# Patient Record
Sex: FEMALE | Race: WHITE | Employment: UNEMPLOYED
[De-identification: names, ages, dates, MRNs, and addresses within clinical notes are randomized per-mention and may not be internally consistent; named-entity substitution may affect disease eponyms.]

---

## 2019-06-28 ENCOUNTER — NURSE TRIAGE (OUTPATIENT)
Dept: OTHER | Facility: CLINIC | Age: 34
End: 2019-06-28

## 2019-06-28 ENCOUNTER — HOSPITAL ENCOUNTER (EMERGENCY)
Age: 34
Discharge: HOME | End: 2019-06-28
Payer: COMMERCIAL

## 2019-06-28 ENCOUNTER — APPOINTMENT (OUTPATIENT)
Dept: GENERAL RADIOLOGY | Age: 34
End: 2019-06-28
Payer: COMMERCIAL

## 2019-06-28 VITALS
TEMPERATURE: 97 F | BODY MASS INDEX: 23.92 KG/M2 | SYSTOLIC BLOOD PRESSURE: 118 MMHG | RESPIRATION RATE: 16 BRPM | DIASTOLIC BLOOD PRESSURE: 82 MMHG | OXYGEN SATURATION: 100 % | HEART RATE: 67 BPM | WEIGHT: 130 LBS | HEIGHT: 62 IN

## 2019-06-28 DIAGNOSIS — M25.511 ACUTE PAIN OF RIGHT SHOULDER: Primary | ICD-10-CM

## 2019-06-28 PROCEDURE — 99283 EMERGENCY DEPT VISIT LOW MDM: CPT

## 2019-06-28 PROCEDURE — 73030 X-RAY EXAM OF SHOULDER: CPT

## 2019-06-28 RX ORDER — NAPROXEN 500 MG/1
500 TABLET ORAL 2 TIMES DAILY WITH MEALS
Qty: 20 TABLET | Refills: 0 | Status: SHIPPED | OUTPATIENT
Start: 2019-06-28 | End: 2019-07-02

## 2019-06-28 ASSESSMENT — PAIN DESCRIPTION - FREQUENCY: FREQUENCY: INTERMITTENT

## 2019-06-28 ASSESSMENT — PAIN - FUNCTIONAL ASSESSMENT: PAIN_FUNCTIONAL_ASSESSMENT: PREVENTS OR INTERFERES SOME ACTIVE ACTIVITIES AND ADLS

## 2019-06-28 ASSESSMENT — PAIN DESCRIPTION - ONSET: ONSET: ON-GOING

## 2019-06-28 ASSESSMENT — PAIN DESCRIPTION - PAIN TYPE: TYPE: ACUTE PAIN

## 2019-06-28 ASSESSMENT — PAIN DESCRIPTION - LOCATION: LOCATION: SHOULDER

## 2019-06-28 ASSESSMENT — PAIN DESCRIPTION - DESCRIPTORS: DESCRIPTORS: BURNING;SHARP

## 2019-06-28 ASSESSMENT — PAIN DESCRIPTION - ORIENTATION: ORIENTATION: RIGHT

## 2019-06-28 ASSESSMENT — PAIN SCALES - GENERAL: PAINLEVEL_OUTOF10: 7

## 2019-06-28 NOTE — TELEPHONE ENCOUNTER
Reason for Disposition   Injury and pain has not improved after 3 days    Protocols used: SHOULDER INJURY-ADULT-[place]    Patient's location of employment: [address] [place] [address] Usman Pena  Location of injury: right shoulder  Time of injury: 0900 on 6/24/2019  Last 4 of patient's SSN: [id number]  Location recommended for treatment: [facility] ED and then [facility]    Patient reports she was at work on 6/24/2019 and moving a patient and felt a burning sensation in right shoulder with resulting pain. Patient reports she has been off of work since initial injury, with rest to right shoulder and her first day back was today. Patient reports she is still with right shoulder pain that has not improved since initial injury and is worse with movement and lifting. Patient denies loss of function of right shoulder, denies numbness or tingling of right arm, denies any visible changes to right arm. Writer recommended patient be evaluated within the next 3 days.
no

## 2019-06-28 NOTE — ED PROVIDER NOTES
[facility]    HPI:  6/28/19,   Time: 11:23 AM         Elida Buchanan is a 28 y.o. 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 The complaint has been persistent, mild in severity, and worsened by movement of right shoulder    ROS:   Pertinent positives and negatives are stated within HPI, all other systems reviewed and are negative.  --------------------------------------------- PAST HISTORY ---------------------------------------------  Past Medical History:  has a past medical history of GERD (gastroesophageal reflux disease), Hyperlipidemia, Hypothyroidism, and Thyroid disease. Past Surgical History:  has no past surgical history on file. Social History:  reports that she has never smoked. She has never used smokeless tobacco. She reports that she does not drink alcohol or use drugs. Family History: family history is not on file. The patients home medications have been reviewed. Allergies: Bee venom    -------------------------------------------------- RESULTS -------------------------------------------------  All laboratory and radiology results have been personally reviewed by myself   LABS:  No results found for this visit on 06/28/19. RADIOLOGY:  Interpreted by Radiologist.  XR SHOULDER RIGHT (MIN 2 VIEWS)   Final Result   No radiographic evidence of acute osseous abnormality or   fracture. . If there is persistent clinical pain or symptomatology, a   return to medical attention within 2-7 days and further imaging is   recommended. ------------------------- NURSING NOTES AND VITALS REVIEWED ---------------------------   The nursing notes within the ED encounter and vital signs as below have been reviewed.    /82   Pulse 67   Temp 97 °F (36.1 °C) (Oral)   Resp 16   Ht 5' 2\" (1.575 m)   Wt 130 lb (59 kg)   LMP 06/07/2019   SpO2 100%   BMI

## 2019-07-23 ENCOUNTER — NURSE TRIAGE (OUTPATIENT)
Dept: OTHER | Facility: CLINIC | Age: 34
End: 2019-07-23

## 2020-06-25 ENCOUNTER — OFFICE VISIT (OUTPATIENT)
Dept: PAIN MANAGEMENT | Age: 35
End: 2020-06-25
Payer: COMMERCIAL

## 2020-06-25 VITALS
WEIGHT: 128 LBS | HEIGHT: 62 IN | DIASTOLIC BLOOD PRESSURE: 68 MMHG | HEART RATE: 63 BPM | OXYGEN SATURATION: 99 % | BODY MASS INDEX: 23.55 KG/M2 | RESPIRATION RATE: 18 BRPM | TEMPERATURE: 98.5 F | SYSTOLIC BLOOD PRESSURE: 98 MMHG

## 2020-06-25 PROBLEM — M53.3 DISORDER OF SACRUM: Status: ACTIVE | Noted: 2020-06-25

## 2020-06-25 PROBLEM — M47.816 LUMBAR SPONDYLOSIS: Status: ACTIVE | Noted: 2020-06-25

## 2020-06-25 PROBLEM — M47.816 LUMBAR FACET ARTHROPATHY: Status: ACTIVE | Noted: 2020-06-25

## 2020-06-25 PROBLEM — M79.18 MYOFASCIAL PAIN SYNDROME: Status: ACTIVE | Noted: 2020-06-25

## 2020-06-25 PROBLEM — G89.4 CHRONIC PAIN SYNDROME: Status: ACTIVE | Noted: 2020-06-25

## 2020-06-25 PROCEDURE — 1036F TOBACCO NON-USER: CPT | Performed by: PAIN MEDICINE

## 2020-06-25 PROCEDURE — 99204 OFFICE O/P NEW MOD 45 MIN: CPT | Performed by: PAIN MEDICINE

## 2020-06-25 PROCEDURE — G8420 CALC BMI NORM PARAMETERS: HCPCS | Performed by: PAIN MEDICINE

## 2020-06-25 PROCEDURE — G8427 DOCREV CUR MEDS BY ELIG CLIN: HCPCS | Performed by: PAIN MEDICINE

## 2020-06-25 RX ORDER — TIZANIDINE 2 MG/1
2 TABLET ORAL DAILY PRN
Qty: 30 TABLET | Refills: 0 | Status: SHIPPED | OUTPATIENT
Start: 2020-06-25 | End: 2020-07-25

## 2020-06-25 NOTE — PROGRESS NOTES
Marital status:      Spouse name: Not on file    Number of children: Not on file    Years of education: Not on file    Highest education level: Not on file   Occupational History    Not on file   Social Needs    Financial resource strain: Not on file    Food insecurity     Worry: Not on file     Inability: Not on file    Transportation needs     Medical: Not on file     Non-medical: Not on file   Tobacco Use    Smoking status: Never Smoker    Smokeless tobacco: Never Used   Substance and Sexual Activity    Alcohol use: Yes    Drug use: Yes     Types: Marijuana     Comment: tried for pain    Sexual activity: Yes     Partners: Male   Lifestyle    Physical activity     Days per week: Not on file     Minutes per session: Not on file    Stress: Not on file   Relationships    Social connections     Talks on phone: Not on file     Gets together: Not on file     Attends Denominational service: Not on file     Active member of club or organization: Not on file     Attends meetings of clubs or organizations: Not on file     Relationship status: Not on file    Intimate partner violence     Fear of current or ex partner: Not on file     Emotionally abused: Not on file     Physically abused: Not on file     Forced sexual activity: Not on file   Other Topics Concern    Not on file   Social History Narrative    Not on file     History reviewed. No pertinent family history. REVIEW OF SYSTEMS:     Patient specifically denies fever/chills, chest pain, shortness of breath, new bowel or bladder complaints. All other review of systems was negative. PHYSICAL EXAMINATION:      BP 98/68   Pulse 63   Temp 98.5 °F (36.9 °C) (Oral)   Resp 18   Ht 5' 2\" (1.575 m)   Wt 128 lb (58.1 kg)   LMP 06/02/2020 (Approximate)   SpO2 99%   BMI 23.41 kg/m²     General:      General appearance:   pleasant and well-hydrated.    , in moderate discomfort and A & O x3  Build:Normal Weight    HEENT:    Head:normocephalic and

## 2020-07-06 ENCOUNTER — HOSPITAL ENCOUNTER (OUTPATIENT)
Age: 35
Discharge: HOME | End: 2020-07-06
Payer: COMMERCIAL

## 2020-07-06 LAB
T4 FREE: 0.74 NG/DL (ref 0.93–1.7)
TSH SERPL DL<=0.05 MIU/L-ACNC: 39.48 UIU/ML (ref 0.27–4.2)

## 2020-07-06 PROCEDURE — 84443 ASSAY THYROID STIM HORMONE: CPT

## 2020-07-06 PROCEDURE — 36415 COLL VENOUS BLD VENIPUNCTURE: CPT

## 2020-07-06 PROCEDURE — 84439 ASSAY OF FREE THYROXINE: CPT

## 2020-07-10 ENCOUNTER — TELEPHONE (OUTPATIENT)
Dept: PAIN MANAGEMENT | Age: 35
End: 2020-07-10

## 2020-07-13 ENCOUNTER — HOSPITAL ENCOUNTER (OUTPATIENT)
Dept: MRI IMAGING | Age: 35
Discharge: HOME | End: 2020-07-15
Payer: COMMERCIAL

## 2020-07-13 PROCEDURE — 72148 MRI LUMBAR SPINE W/O DYE: CPT

## 2020-07-29 ENCOUNTER — OFFICE VISIT (OUTPATIENT)
Dept: PAIN MANAGEMENT | Age: 35
End: 2020-07-29
Payer: COMMERCIAL

## 2020-07-29 VITALS
DIASTOLIC BLOOD PRESSURE: 62 MMHG | OXYGEN SATURATION: 98 % | WEIGHT: 130 LBS | BODY MASS INDEX: 23.92 KG/M2 | TEMPERATURE: 98 F | HEART RATE: 64 BPM | RESPIRATION RATE: 16 BRPM | SYSTOLIC BLOOD PRESSURE: 98 MMHG | HEIGHT: 62 IN

## 2020-07-29 PROCEDURE — G8420 CALC BMI NORM PARAMETERS: HCPCS | Performed by: PAIN MEDICINE

## 2020-07-29 PROCEDURE — 99213 OFFICE O/P EST LOW 20 MIN: CPT | Performed by: PAIN MEDICINE

## 2020-07-29 PROCEDURE — G8427 DOCREV CUR MEDS BY ELIG CLIN: HCPCS | Performed by: PAIN MEDICINE

## 2020-07-29 PROCEDURE — 1036F TOBACCO NON-USER: CPT | Performed by: PAIN MEDICINE

## 2020-07-29 PROCEDURE — 99214 OFFICE O/P EST MOD 30 MIN: CPT | Performed by: PAIN MEDICINE

## 2020-07-29 NOTE — PROGRESS NOTES
Do you currently have any of the following:    Fever: No  Headache:  No  Cough: No  Shortness of breath: No  Exposed to anyone with these symptoms: No                                                                                                                Magdi Cifuentes presents to the [address] [address] on 7/29/2020. Tony Maldonado is complaining of pain lower back. The pain is intermittent. The pain is described as throbbing, shooting, stabbing, dull and sharp. Pain is rated on her best day at a 2, on her worst day at a 10, and on average at a 4 on the VAS scale. She took her last dose of Motrin 600mg. Tony Maldonado does have issues with constipation. Any procedures since your last visit: No, with  % relief. She is  on NSAIDS and  is not on anticoagulation medications to include none and is managed by . Pacemaker or defibrilator: No Physician managing device is . BP 98/62   Pulse 64   Temp 98 °F (36.7 °C) (Oral)   Resp 16   Ht 5' 2\" (1.575 m)   Wt 130 lb (59 kg)   SpO2 98%   BMI 23.78 kg/m²      No LMP recorded.

## 2020-07-29 NOTE — PROGRESS NOTES
[facility], [address] [place] [address] Pankaj  [phone]    Follow up Note      Sam Barreto     Date of Visit:  7/29/2020    CC:  Patient presents for follow up   Chief Complaint   Patient presents with    Follow-up     lower back     HPI:    Pain is unchanged. Appropriate analgesia with current medications regimen:N/A. Change in quality of symptoms:no. Medication side effects:not applicable . Recent diagnostic testing:Lumbar spine MRI. Recent interventional procedures:none. .    She has not been on anticoagulation medications to include none. The patient  has not been on herbal supplements. The patient is not diabetic.     Imaging:   Lumbar spine MRI 2020:  Degenerative disc disease as described above, without spinal canal narrowing.         Foraminal narrowing, mild at right L4-5, minimal at right L5-S1. Lumbar spine Xray 2020  3 views of the lumbar spine were obtained. There are 5 lumbar   vertebral bodies which are of normal alignment. I see no fracture or   destructive process. There is mild degenerative changes involving L4-S1   level with marked disc space narrowing.     Previous treatments: Physical Therapy and medications. .         Potential Aberrant Drug-Related Behavior:    None    Urine Drug Screening:  None    OARRS report:  07/2020 consistent. Past Medical History:   Diagnosis Date    GERD (gastroesophageal reflux disease)     Hyperlipidemia     Hypothyroidism     Thyroid disease      History reviewed. No pertinent surgical history. Prior to Admission medications    Medication Sig Start Date End Date Taking?  Authorizing Provider   levothyroxine (SYNTHROID) 50 MCG tablet Take 1 tablet by mouth daily 7/10/20  Yes Melanie Ramirez, DO   levothyroxine (SYNTHROID) 50 MCG tablet Take 1 tablet by mouth daily 4/15/20  Yes Melanie Ramirez, DO   sertraline (ZOLOFT) 50 MG tablet Take 1 tablet by mouth daily 4/15/20  Yes Karli Lozano negative. PHYSICAL EXAMINATION:      BP 98/62   Pulse 64   Temp 98 °F (36.7 °C) (Oral)   Resp 16   Ht 5' 2\" (1.575 m)   Wt 130 lb (59 kg)   SpO2 98%   BMI 23.78 kg/m²     General:       General appearance:   pleasant and well-hydrated. , in mild to moderate discomfort and A & O x3  Build:Normal Weight     HEENT:     Head:normocephalic and atraumatic  Sclera: icterus absent,      Lungs:     Breathing:Breathing Pattern: regular, no distress     Abdomen:     Shape:non-distended and normal  Tenderness:none and suprapubic     Lumbar spine:     Spine inspection:normal   CVA tenderness:No   Palpation:tenderness paravertebral muscles, facet loading, left, right, positive and tenderness. Range of motion:abnormal moderately Lateral bending, flexion, extension rotation bilateral and is  painful.     Musculoskeletal:     Trigger points in Paraveteral:absent bilaterally  SI joint tenderness:positive right, negative left              LEATHA test:positive right, negative left  Piriformis tenderness:negative right, negative left  Trochanteric bursa tenderness:negative right, negative left  SLR:negative right, negative left, sitting      Extremities:     Tremors:None bilaterally upper and lower  Range of motion:pain with internal rotation of hips negative. Intact:Yes  Edema:Normal     Neurological:     Sensory:normal to light touch bilateral lower extremities  Motor:                          Right Quadriceps5/5          Left Quadriceps5/5           Right Gastrocnemius5/5    Left Gastrocnemius5/5  Right Ant Tibialis5/5  Left Ant Tibialis5/5  Reflexes:    Right Quadriceps reflex2+  Left Quadriceps reflex2+  Right Achilles reflex2+  Left Achilles reflex2+  Gait:normal     Dermatology:     Skin:no unusual rashes and no skin lesions    Kaur Glen Spey was present for the entire physical exam.     Impression:  Chronic low back pain with no radiculopathy. Lumbar spine Xray: mild degenerative changes L4-S1.   Plan:  Follow up on her low back and right buttocks pain. Results of lumbar spine MRI were discussed with the patient including actual images. L4-5 and L5-S1 degenerative disc disease. Patient didn't start physical therapy yet. Will consider LESI if her response is less than expected with physical therapy. Continue with OTC pain medications. Continue with Zanaflex 2 mg QD PRN. OARRS report reviewed 07/2020. Patient encouraged to stay active. Treatment plan discussed with the patient including medications side effects. We discussed with the patient that combining opioids, benzodiazepines, alcohol, illicit drugs or sleep aids increases the risk of respiratory depression including death. We discussed that these medications may cause drowsiness, sedation or dizziness and have counseled the patient not to drive or operate machinery. We have discussed that these medications will be prescribed only by one provider. We have discussed with the patient about age related risk factors and have thoroughly discussed the importance of taking these medications as prescribed. The patient verbalizes understanding. ccrefvalentina Ibrahim M.D.

## 2020-08-10 ENCOUNTER — EVALUATION (OUTPATIENT)
Dept: PHYSICAL THERAPY | Age: 35
End: 2020-08-10
Payer: COMMERCIAL

## 2020-08-10 PROCEDURE — 97162 PT EVAL MOD COMPLEX 30 MIN: CPT | Performed by: PHYSICAL THERAPIST

## 2020-08-10 NOTE — PROGRESS NOTES
Physical Therapy Treatment Note    Date: 8/10/2020  Patient Name: Daniela Yeung  : 1985   MRN: [id number]  DOInjury:   DOSx: --  Referring Provider: Riley Colmenares MD  [address] [place] [address]  [address]     Medical Diagnosis:      Diagnosis Orders   1. Lumbar facet arthropathy     2. Myofascial pain syndrome         Outcome Measure:   Oswestry Low Back Disability Questionnaire 32% disability    S: See eval  O:  Time 6030-3323     Visit  Repeat outcome measure at mid point and end. Pain 2/10     ROM      Modalities      MH  Next? MO   Lumbar traction Next   MO         Manual            Stretch      PPT Next   NR   DKTC HEP  TE   Seated flexion HEP  TE      TE   Exercise            ROWS: H   TA   ROWS: M   TA   ROWS: L   TA   Obliques - high   TA   Lawnmower Pulls   TA   Standing rectus pull down   TA   Cross country ski   TA   Sidekicks   TA   Marching   TA   Squats   TA      TA      TA      TA               A:  Tolerated well. Above added to written HEP. Discussed anatomy, physiology, body mechanics, principles of loading, and progressive loading/activity.   P: Continue with rehab plan  Nicole Lange PT    Treatment Charges: Mins Units   Initial Evaluation 45 1   Re-Evaluation     Ther Exercise         TE     Manual Therapy     MT     Ther Activities        TA     Gait Training          GT     Neuro Re-education NR     Modalities     Non-Billable Service Time     Other     Total Time/Units 45 1

## 2020-08-10 NOTE — PROGRESS NOTES
[facility] OUTPATIENT REHABILITATION  PHYSICAL THERAPY INITIAL EVALUATION         Date:  8/10/2020   Patient: Daniela Yeung  : 1985  MRN: [id number]  Referring Provider: Riley Colmenares MD  [address] [place] [address]  [address] Diagnosis:      Diagnosis Orders   1. Lumbar facet arthropathy     2. Myofascial pain syndrome         SUBJECTIVE:     Onset date:     Onset: Insidious onset    History / Mechanism of Injury: Reports developing pain in 2016 that has gradually worsened over the last year. 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 This last year she has been working for [employer] and has been having increasing trouble tolerating loading and walking. Previous PT: yes - did not help and treatment consisted of US, knee to chest stretches    Medical Management for Current Problem: OTC meds     Chief complaint: pain, decreased motion, weakness, limited ability to lift/carry/handle material, limited ability to complete ADLs and IADLs and limited ability to complete home/outdoor chores/tasks    Behavior: condition is getting worse    Pain: constant  Current: 1/10     Best: 1/10     Worst:9/10    Symptom Type/Quality: dull, aching, throbbing  Location[de-identified] Back: lumbar region          Provoking Activities/Positions: lifting/carrying/material handling, lying on stomach                 Relieving Activitie/Positions: sitting, stretching (fetal position)    Disturbed Sleep: yes  Bladder Dysfunction: no  Bowel Dysfunction: no     Imaging results: Mri Lumbar Spine Wo Contrast    Result Date: 2020  EXAMINATION: MRI OF THE LUMBAR SPINE WITHOUT CONTRAST, 2020 8:40 am TECHNIQUE: Multiplanar multisequence MRI of the lumbar spine was performed without the administration of intravenous contrast. COMPARISON: None. HISTORY: ORDERING SYSTEM PROVIDED HISTORY: Lumbar spondylosis FINDINGS: BONES/ALIGNMENT: There is normal alignment of the lumbar spine. The vertebral body heights are maintained. There is no fracture or destructive osseous lesion. There is degenerative disc disease with disc desiccation and endplate changes, most pronounced at L4-5. The intervertebral disc heights are grossly maintained. SPINAL CORD: The conus terminates normally. SOFT TISSUES: No paraspinal mass identified. L1-L2: There is no significant disc herniation, spinal canal stenosis or neural foraminal narrowing. L2-L3: There is no significant disc herniation, spinal canal stenosis or neural foraminal narrowing. L3-L4: There is no significant disc herniation, spinal canal stenosis or neural foraminal narrowing. L4-L5: There is disc bulge, superimposed central disc protrusion, with mild right foraminal narrowing. No spinal canal narrowing. L5-S1: There is disc bulge, annular fissure with minimal right foraminal narrowing. No spinal canal narrowing. Degenerative disc disease as described above, without spinal canal narrowing. Foraminal narrowing, mild at right L4-5, minimal at right L5-S1. Past Medical History:  Past Medical History:   Diagnosis Date    GERD (gastroesophageal reflux disease)     Hyperlipidemia     Hypothyroidism     Thyroid disease      No past surgical history on file. Medications:   Current Outpatient Medications   Medication Sig Dispense Refill    levothyroxine (SYNTHROID) 50 MCG tablet Take 1 tablet by mouth daily 30 tablet 2    EPINEPHrine (EPIPEN 2-OJSE) 0.3 MG/0.3ML SOAJ injection Inject 0.3 mLs into the muscle once for 1 dose Use as directed for allergic reaction 0.3 mL 2    levothyroxine (SYNTHROID) 50 MCG tablet Take 1 tablet by mouth daily 30 tablet 0    sertraline (ZOLOFT) 50 MG tablet Take 1 tablet by mouth daily 30 tablet 0    Multiple Vitamins-Minerals (MULTIVITAMIN ADULT) TABS Take by mouth       No current facility-administered medications for this visit. Occupation: [employer]  .  Physical demands include: lifting, carrying, pushing, pulling heavy weighted items, walking, standing, sitting, operating hand and arm controls. Status: full time; off for next 2 weeks    Exercise regimen: weight training , cardio. Notes med ball crunches on decline board, shoulder press, and abdominal hangs cause pain. Patient Goals: pain relief, return to prior activity    Contraindications/Precautions: none    OBJECTIVE:     Observations: well nourished female, normal affect     Inspection: normal orthopedic exam         Gait: antalgic    Functional Strength:   Able to toe walk, heel walk, and squat. Squat mechanics are altered. Range of Motion:    Trunk:    Flexion:  [x] Normal   [] Limited    Extension:  [x] Normal   [] Limited     Right Rotation: [x] Normal   [] Limited    Left Rotation:  [x] Normal   [] Limited    Right Side Bending: [x] Normal   [] Limited    Left Side Bending: [x] Normal   [] Limited     Compression more painful than tensile forces. Lower Extremity:   Right:   [x] Normal   [] Limited    Left:   [x] Normal   [] Limited       Strength:     Trunk: 3/5   R LE: 5/5   L LE: 5/5    Palpation: Tender to palpation lumbar paraspinals      Sensation: intact to light touch and temperature. Special Tests:   [x] Nerve Root Compression           Right []+ / [x] -    Left []+ / [x] -  [x] Slump           Right []+ / [x] -    Left []+ / [x] -  [] FADIR          Right []+ / [] -    Left []+ / [] -  [] S-I Distraction          Right []+ / [] -    Left []+ / [] -     [] SLR           Right []+ / [] -    Left []+ / [] -     [] LEATHA          Right []+ / [] -    Left []+ / [] -  [] S-I Compression          Right []+ / [] -    Left []+ / [] -   [] Other: []+ / [] -       Special Test Comments: Compression causes pain, not neurological symptoms.      ASSESSMENT     Outcome Measure:   Oswestry Low Back Disability Questionnaire 32% disability    Problems:    Pain reported 1-9/10   Strength decreased   Decreased functional ability with walking, ADLs, bending, reaching, lifting, carrying    [x] There are no barriers affecting plan of care or recovery    [] Barriers to this patient's plan of care or recovery include. Domestic Concerns:  [x] No  [] Yes:    Short Term goals (2-3 weeks)   Decrease reported pain to 4-5/10   Increase Strength to 4/5    Able to perform/complete the following functions/tasks: ADLs   Oswestry Low Back Disability Questionnaire 20% disability    Long Term goals (4-6 weeks)   Decrease reported pain to 0-1/10   Increase Strength to 5/5    Able to perform/complete the following functions/tasks: IADLs, lift/carry heavy items with comfort     Oswestry Low Back Disability Questionnaire 10% disability    Independent with Home Exercise Programs    Rehab Potential: [x] Good  [] Fair  [] Poor    PLAN       Treatment Plan:   [x] Therapeutic Exercise  [x] Therapeutic Activity  [x] Neuromuscular Re-education   [] Gait Training  [] Balance Training  [] Aerobic conditioning  [] Manual Therapy  [] Massage/Fascial release   [] Work/Sport specific activities    [] Pain Neuroscience [x] Cold/hotpack  [] Vasocompression  [x] Electrical Stimulation  [x] Lumbar/Cervical Traction  [] Ultrasound   [] Iontophoresis: 4 mg/mL Dexamethasone Sodium Phosphate 40-80 mAmin        [x] Instruction in HEP      []  Medication allergies reviewed for use of Dexamethasone Sodium Phosphate 4mg/ml  with iontophoresis treatments. Patient is not allergic.       The following CPT codes are likely to be used in the care of this patient: 20570 PT Evaluation: Moderate Complexity , 99467 PT Re-Evaluation , 93648 Therapeutic Exercise , 13005 Neuromuscular Re-Education , 67604 Therapeutic Activities , 95337 Manual Therapy , 80252 Mechanical Traction ,  Electrical Stimulation      Suggested Professional Referral: [x] No  [] Yes:     Patient Education:  [x] Plans/Goals, Risks/Benefits discussed  [x] Home exercise program  Method of Education: [x] Verbal [x] Demo  [x] Written  Comprehension of Education:  [x] Verbalizes understanding. [x] Demonstrates understanding. [] Needs Review. [] Demonstrates/verbalizes understanding of HEP/Ed previously given. Frequency:  2-3 days per week for 4-6 weeks    Patient understands diagnosis/prognosis and consents to treatment, plan and goals: [x] Yes    [] No     Thank you for the opportunity to work with your patient. If you have questions or comments, please contact me at [phone]; fax: [phone]. Electronically signed by: Mukesh Bai PT         Please sign Physician's Certification and return to: [address] [place] [id number]  Dept: [phone]  Dept Fax: [phone] Certification / Comments     Frequency/Duration 2-3 days per week for 4-6 weeks. Certification period from 8/10/2020  to 11/10/2020. I have reviewed the Plan of Care established for skilled therapy services and certify that the services are required and that they will be provided while the patient is under my care.     Physician's Comments/Revisions:               Physician's Printed Name:                                           [de-identified] Signature:                                                               Date:

## 2020-08-14 ENCOUNTER — TELEPHONE (OUTPATIENT)
Dept: PHYSICAL THERAPY | Age: 35
End: 2020-08-14

## 2020-09-02 ENCOUNTER — TREATMENT (OUTPATIENT)
Dept: PHYSICAL THERAPY | Age: 35
End: 2020-09-02
Payer: COMMERCIAL

## 2020-09-02 PROCEDURE — 97112 NEUROMUSCULAR REEDUCATION: CPT | Performed by: PHYSICAL THERAPIST

## 2020-09-02 NOTE — PROGRESS NOTES
Gait Training          GT     Neuro Re-education NR 45 3   Modalities     Non-Billable Service Time     Other     Total Time/Units 45 3

## 2020-09-10 ENCOUNTER — TELEPHONE (OUTPATIENT)
Dept: PHYSICAL THERAPY | Age: 35
End: 2020-09-10

## 2020-09-10 NOTE — TELEPHONE ENCOUNTER
Patient called to cancel and to stop PT in [place]. She lives close to the [facility] and would prefer to go to PT there.

## 2020-11-03 PROBLEM — M47.816 LUMBAR FACET ARTHROPATHY: Status: RESOLVED | Noted: 2020-06-25 | Resolved: 2020-11-03

## 2020-11-11 ENCOUNTER — PREP FOR PROCEDURE (OUTPATIENT)
Dept: PAIN MANAGEMENT | Age: 35
End: 2020-11-11

## 2020-11-11 ENCOUNTER — OFFICE VISIT (OUTPATIENT)
Dept: PAIN MANAGEMENT | Age: 35
End: 2020-11-11
Payer: COMMERCIAL

## 2020-11-11 VITALS
HEART RATE: 78 BPM | SYSTOLIC BLOOD PRESSURE: 110 MMHG | DIASTOLIC BLOOD PRESSURE: 72 MMHG | RESPIRATION RATE: 16 BRPM | TEMPERATURE: 97.7 F

## 2020-11-11 PROBLEM — M51.36 DDD (DEGENERATIVE DISC DISEASE), LUMBAR: Status: ACTIVE | Noted: 2020-11-11

## 2020-11-11 PROBLEM — M53.3 SACROILIAC DYSFUNCTION: Status: ACTIVE | Noted: 2020-11-11

## 2020-11-11 PROBLEM — M51.369 DDD (DEGENERATIVE DISC DISEASE), LUMBAR: Status: ACTIVE | Noted: 2020-11-11

## 2020-11-11 PROCEDURE — 99215 OFFICE O/P EST HI 40 MIN: CPT | Performed by: ANESTHESIOLOGY

## 2020-11-11 PROCEDURE — G8427 DOCREV CUR MEDS BY ELIG CLIN: HCPCS | Performed by: ANESTHESIOLOGY

## 2020-11-11 PROCEDURE — 99213 OFFICE O/P EST LOW 20 MIN: CPT

## 2020-11-11 PROCEDURE — G8484 FLU IMMUNIZE NO ADMIN: HCPCS | Performed by: ANESTHESIOLOGY

## 2020-11-11 PROCEDURE — 4004F PT TOBACCO SCREEN RCVD TLK: CPT | Performed by: ANESTHESIOLOGY

## 2020-11-11 PROCEDURE — G8420 CALC BMI NORM PARAMETERS: HCPCS | Performed by: ANESTHESIOLOGY

## 2020-11-11 RX ORDER — IBUPROFEN 800 MG/1
800 TABLET ORAL EVERY 6 HOURS PRN
COMMUNITY
End: 2021-05-18 | Stop reason: ALTCHOICE

## 2020-11-11 NOTE — PROGRESS NOTES
Do you currently have any of the following:    Fever: No  Headache:  No  Cough: No  Shortness of breath: No  Exposed to anyone with these symptoms: No         Kierra Yuan presents to the [facility] on 11/11/2020. Devorah Prajapati is complaining of pain in her lower back and sometimes down the left leg. The pain is persistent. The pain is described as aching and dull. Pain is rated on her best day at a 4, on her worst day at a 9, and on average at a 4 on the VAS scale. She took her last dose of Motrin 2 days ago. Any procedures since your last visit: No.    Pacemaker or defibrilator: No managed by N/A. She is  on NSAIDS and is not on anticoagulation medications. /72   Pulse 78   Temp 97.7 °F (36.5 °C)   Resp 16      No LMP recorded.

## 2020-11-11 NOTE — PROGRESS NOTES
The    vertebral body heights are maintained.  There is no fracture or destructive    osseous lesion.  There is degenerative disc disease with disc desiccation and    endplate changes, most pronounced at L4-5.  The intervertebral disc heights    are grossly maintained.         SPINAL CORD: The conus terminates normally.         SOFT TISSUES: No paraspinal mass identified.         L1-L2: There is no significant disc herniation, spinal canal stenosis or    neural foraminal narrowing.         L2-L3: There is no significant disc herniation, spinal canal stenosis or    neural foraminal narrowing.         L3-L4: There is no significant disc herniation, spinal canal stenosis or    neural foraminal narrowing.         L4-L5: There is disc bulge, superimposed central disc protrusion, with mild    right foraminal narrowing.  No spinal canal narrowing.         L5-S1: There is disc bulge, annular fissure with minimal right foraminal    narrowing.  No spinal canal narrowing.              Impression    Degenerative disc disease as described above, without spinal canal narrowing.         Foraminal narrowing, mild at right L4-5, minimal at right L5-S1. Xray LS spein: 6/17/2020:    RESULT: 3 views of the lumbar spine were obtained. There are 5 lumbar   vertebral bodies which are of normal alignment. I see no fracture or   destructive process. There is mild degenerative changes involving L4-S1   level with marked disc space narrowing. IMPRESSION  IMPRESSION: MILD DEGENERATIVE CHANGES MARKED DISC SPACE NARROWING LOWER   LUMBAR SPINE    Past Medical History:   Diagnosis Date    GERD (gastroesophageal reflux disease)     Hyperlipidemia     Hypothyroidism     Thyroid disease        History reviewed. No pertinent surgical history. Prior to Admission medications    Medication Sig Start Date End Date Taking?  Authorizing Provider   ibuprofen (ADVIL;MOTRIN) 800 MG tablet Take 800 mg by mouth every 6 hours as needed for Pain Yes Historical Provider, MD   etonogestrel-ethinyl estradiol (NUVARING) 0.12-0.015 MG/24HR vaginal ring CHANGE EVERY 3 WEEKS 10/3/20  Yes Historical Provider, MD   levothyroxine (SYNTHROID) 50 MCG tablet Take 1 tablet by mouth daily 7/10/20  Yes Troy Prajapati, DO   Multiple Vitamins-Minerals (MULTIVITAMIN ADULT) TABS Take by mouth   Yes Historical Provider, MD   EPINEPHrine (EPIPEN 2-JOSE) 0.3 MG/0.3ML SOAJ injection Inject 0.3 mLs into the muscle once for 1 dose Use as directed for allergic reaction 6/16/20 6/16/20  Troy Prajapati, DO       Allergies   Allergen Reactions    Bee Venom        Social History     Socioeconomic History    Marital status:      Spouse name: Not on file    Number of children: Not on file    Years of education: Not on file    Highest education level: Not on file   Occupational History    Not on file   Social Needs    Financial resource strain: Not on file    Food insecurity     Worry: Not on file     Inability: Not on file    Transportation needs     Medical: Not on file     Non-medical: Not on file   Tobacco Use    Smoking status: Current Some Day Smoker    Smokeless tobacco: Never Used   Substance and Sexual Activity    Alcohol use: Not Currently    Drug use: Not Currently     Types: Marijuana     Comment: tried for pain    Sexual activity: Yes     Partners: Male   Lifestyle    Physical activity     Days per week: Not on file     Minutes per session: Not on file    Stress: Not on file   Relationships    Social connections     Talks on phone: Not on file     Gets together: Not on file     Attends [employer] service: Not on file     Active member of club or organization: Not on file     Attends meetings of clubs or organizations: Not on file     Relationship status: Not on file    Intimate partner violence     Fear of current or ex partner: Not on file     Emotionally abused: Not on file     Physically abused: Not on file     Forced sexual activity: Not on file Other Topics Concern    Not on file   Social History Narrative    Not on file       Family History   Problem Relation Age of Onset    Cancer Father     Hypertension Mother        REVIEW OF SYSTEMS:     Patient specifically denies fever/chills, chest pain, shortness of breath, new bowel or bladder complaints. All other review of systems was negative. Review of Systems - documented in the intake sheet reviewed. PHYSICAL EXAMINATION:      /72   Pulse 78   Temp 97.7 °F (36.5 °C)   Resp 16     General:      General appearance:  Pleasant and well-hydrated, in no distress and A & O x 3  Build:Normal Weight  Function: Rises from seated position easily and Moves about room without difficulty    HEENT:    Head:normocephalic, atraumatic  Pupils:regular, round, equal  Sclera: icterus absent    Lungs:    Breathing:normal breathing pattern     CVS:     RRR    Abdomen:    Shape:non-distended and normal  Tenderness:none  Guarding:none    Cervical spine:    Inspection:normal  Palpation:tenderness paravertebral muscles, tenderness trapezium, left, right and negative. Range of motion:Normal flexion, extension, rotation bilaterally and is not painful. Spurling's: negative bilaterally    Thoracic spine:     Spine inspection:normal   Palpation:No tenderness over the midline and paraspinal area, bilaterally  Range of motion:normal in flexion, extension rotation bilateral and is not painful. Lumbar spine:    Spine inspection: Normal   Palpation: Tenderness paravertebral muscles Yes bilaterally  Range of motion: Decreased, flexion Decreased, Lateral bending, extension and rotation bilaterally reduced is painful.   Sacroiliac joint tenderness Yes left  LEATHA test: positive left  Gaenslen's test:positive left   Piriformis tenderness: negative bilaterally  SLR : negative bilaterally  Trochanteric bursa tenderness: negative bilaterally  CVA tenderness:No       Musculoskeletal:    Trigger points in trapezius: No bilaterally  Trigger points in rhomboids: No bilaterally  Trigger points in Paravertebral: No bilaterally      Extremities:    Tremors:None bilaterally upper and lower  Edema:none x all 4 extremities    Knee:    ROM : no pain   Joint line tenderness : no    Neurological:    Sensory: Normal to light touch     Motor:   Right  5/5              Left  5/5               Right Bicep 5/5           Left Bicep 5/5              Right Triceps 5/5       Left Triceps 5/5          Right Deltoid 5/5     Left Deltoid 5/5                  Right Quadriceps 5/5          Left Quadriceps 5/5           Right Gastrocnemius 5/5    Left Gastrocnemius 5/5  Right Ant Tibialis 5/5  Left Ant Tibialis 5/5    Reflexes:    Right Brachioradialis reflex 2+  Left Brachioradialis reflex 2+  Right Biceps reflex 2+  Left Biceps reflex 2+  Right Triceps reflex 2+  Left Triceps reflex 2+  Right Quadriceps reflex 2+  Left Quadriceps reflex 2+  Right Achilles reflex 2+  Left Achilles reflex 2+    Gait:normal Yes    Dermatology:    Skin:no rashes or lesions noted    Assessment/Plan:     Diagnosis Orders   1. DDD (degenerative disc disease), lumbar     2. Sacroiliac dysfunction     3. Lumbosacral spondylosis without myelopathy         38 y.o. female with H/o chronic low back pain and intermittent left thigh pain. Features of left SIJ pain and lumbar DDD/ facet pain. MRI findings reviewed personally and discussed with the patient: Mild DD at L4-5, L5-S1. No significant Neuroforaminal compromise or herniation. Has been under the care of Dr. Rosa Mcmahon and had plans for interventions. Failed conservative treatment with PT/ Chiro/ meds. Plan:  LESI L5-S1 under fluoroscopic guidance. Will add left SIJ injection. RBA discussed and patient agreed. If axial low back pain persists, will consider lumbar facet MBNB/ RFA in future. NSAID's for prn use. PT/ will set up for aqua therapy. Continue HEP. Smoking cessation counseling.     Urine

## 2020-11-12 ENCOUNTER — HOSPITAL ENCOUNTER (OUTPATIENT)
Dept: PHYSICAL THERAPY | Age: 35
Setting detail: THERAPIES SERIES
Discharge: HOME | End: 2020-11-12
Payer: COMMERCIAL

## 2020-11-12 ENCOUNTER — TELEPHONE (OUTPATIENT)
Dept: PAIN MANAGEMENT | Age: 35
End: 2020-11-12

## 2020-11-12 PROCEDURE — 97161 PT EVAL LOW COMPLEX 20 MIN: CPT

## 2020-11-12 NOTE — TELEPHONE ENCOUNTER
Call to Fern Brand and left message that procedure was approved for 11/23/2020 and that the surgery center should call her a few days before for the pre op call and after 3:00 PM the business day before with the arrival time. Instructed Adela to hold ibuprofen for 24 hours, naprosyn for 4 days and any aspirin containing products for 7 days. Instructed to call office back to confirm receipt and if any questions.  I

## 2020-11-12 NOTE — PROGRESS NOTES
Physical Therapy  Initial Assessment  Date: 2020  Patient Name: Claudette Grief  MRN: [id number]  : 1985          Restrictions       Subjective   General  Chart Reviewed: Yes  Patient assessed for rehabilitation services?: Yes  Additional Pertinent Hx: Patient presents to PT with complaint of persistent pain affecting the lumbar region Pain has been present several years no specific JES MRI + for mild L4/5 and L5/S1 disc herniations  Family / Caregiver Present: No  Referring Practitioner: Dr Michelle Avila  Referral Date : 20  Diagnosis: Back Pain  Follows Commands: Within Functional Limits  PT Visit Information  Onset Date: 20  PT Insurance Information: [employer]  Subjective  Subjective: Pain related to both activity and sustained static postures  Pain Screening  Patient Currently in Pain: Yes  Vital Signs  Patient Currently in Pain: Yes    Vision/Hearing  Vision  Vision: Within Functional Limits  Hearing  Hearing: Within functional limits    Orientation  Orientation  Overall Orientation Status: Within Functional Limits    Social/Functional History  Social/Functional History  Lives With: Family  Type of Home: House  Home Layout: One level  Homemaking Responsibilities: Yes  Active : Yes  Mode of Transportation: Car  Occupation: Unemployed    Objective     Observation/Palpation  Posture: Fair  Palpation: Tender to palpation left lower lumbar left SI region    AROM RLE (degrees)  RLE AROM: WFL  AROM LLE (degrees)  LLE AROM : WFL  Spine  Lumbar: Flex/Ext limited 50^ with pain    Strength RLE  Comment: 3+ to 4-/5  Strength LLE  Comment: 3+ to 4-/5  Strength Other  Other: trunk/core 3 to 3+/5  Tone RLE  RLE Tone: Normotonic  Tone LLE  LLE Tone: Normotonic  Motor Control  Gross Motor?: WFL  Additional Measures  Flexibility: deficits of flexibility General                Ambulation  Ambulation?: Yes  Ambulation 1  Device: No Device  Assistance: Independent  Gait Deviations: None Assessment   Conditions Requiring Skilled Therapeutic Intervention  Body structures, Functions, Activity limitations: Decreased functional mobility ; Increased pain;Decreased posture;Decreased ROM; Decreased strength;Decreased endurance  Prognosis: Fair  Decision Making: Low Complexity  REQUIRES PT FOLLOW UP: Yes         Plan   Plan  Times per week: 2  Plan weeks: 5  Current Treatment Recommendations: ROM, Strengthening, Functional Mobility Training, Endurance Training, Aquatics    G-Code       OutComes Score                                                  AM-PAC Score             Goals          Therapy Time   Individual Concurrent Group Co-treatment   Time In 1300         Time Out 1330         Minutes 30         Timed Code Treatment Minutes: 27 Minutes       Link Barrios, PT

## 2020-11-16 ENCOUNTER — HOSPITAL ENCOUNTER (OUTPATIENT)
Dept: PHYSICAL THERAPY | Age: 35
Setting detail: THERAPIES SERIES
Discharge: HOME | End: 2020-11-16
Payer: COMMERCIAL

## 2020-11-16 PROCEDURE — 97113 AQUATIC THERAPY/EXERCISES: CPT

## 2020-11-16 NOTE — PROGRESS NOTES
[facility]  Phone: [phone] Fax: [phone]        Physical Therapy Aquatic Flow Sheet   Date:  2020    Patient Name:  Trent Shaffer    :  1985  Restrictions/Precautions:    Diagnosis:  LBP  Treatment Diagnosis:  Back Pain  Insurance/Certification information:  [employer]  Referring Physician:   Adolfo P[place] of care signed (Y/N):    Visit# / total visits:  2/10  Pain level: 8/10     Electronically signed by:  Madhavi Avila PTA  Time In:1315  Time EJR:8186    Key  B= Belt DB= Dumbells T= Theratube   H= Hydrotone N= Noodles W= Weights   P= Paddles S= Speedo equipment K= Kickboard     Exercises/Activities   Warm-up/Amb  Minutes  Exercises  minutes    Slow forward  5  HR/TR  2    Slow sideways  5  Marches  2    Slow backwards  5  Mini-squats  2    Medium forward    Forward backward kick  2    Medium sideways    Hip bduction  2    Small shuffle    Hamstring curls      Jog    Knee extension      Braiding    Pelvic tilts      Bicycling  5  Scap squeezes          Shoulder flex/ext      Functional    Shoulder abd/add      Step    Shoulder H. abd/add      Lifting    Shoulder IR/ER      Hand to opp knee    Rowing      Push down squat    Bilateral pull down      UE PNF    Push/pull      LE PNF    Push downs      Wall push ups    Arm circles      SLS    Elbow flex/ext          Chin tuck      Stretching    UT shrugs/rolls      Gastroc/Soleus    Rocking horse      Hamstring          SKTC    Other      Piriformis          Hip flexor          Ladder pull          Pec stretch          Post deltoid           Timed Code Treatment Minutes:  30  Total Treatment Minutes:  30  Treatment/Activity Tolerance:  [x] Patient tolerated treatment well [] Patient limited by fatique  [] Patient limited by pain [] Patient limited by other medical complications  [] Other:     Prognosis: [] Good [x] Fair  [] Poor    Patient Requires Follow-up: [x] Yes  [] No    Plan:   [x] Continue per plan of

## 2020-11-17 NOTE — FLOWSHEET NOTE
[facility]  Phone: [phone] Fax: [phone]     Physical Therapy  Out Patient Initial Evaluation    Date:  2020    Patient Name:  Madison Wellington    :  1985  MRN: [id number]    DIAGNOSIS:  Back pain   EVALUATION DATE:  2020  REFERRING PHYSICIAN:  Dr Romero Martinez:  2020    PROBLEMS FOUND DURING EVALUATION  · Pain: Affects the lumbar and LS areas, Pain is rated as Constant  · Postural deficits/Postural Guarding   · Limited ROM/Mobility lumbar region   · Strength deficits trunk/core     SHORT TERM GOALS  · Patient will be able to engage in consistent and progressive active exercise while reporting no increase in back pain intensity     LONG TERM GOALS  · Patient will be able to engage in ADL's on a consistent basis while being able to effectively control back pain at 3/10 or less  · Postural guarding will be minimal   · Trunk/hip ROM will be Fair or better  · Strength trunk/core 3+ to 4-/5   · Patient will be able to maintain and self direct an appropriate follow up independent exercise program     PATIENT GOALS  · Control back pain     REHAB POTENTIAL:  Fair    FREQUENCY/DURATION:  2 times per week 4 weeks     PLAN OF CARE:  Aquatic based active exercise       Thank you for the opportunity to work with your patient. If you have questions or comments, please feel free to contact me by phone or fax.       Electronically Signed by Juan C Hughes  PT [id number]        ___________________________________  2020    Physician     Date

## 2020-11-18 ENCOUNTER — HOSPITAL ENCOUNTER (OUTPATIENT)
Dept: PHYSICAL THERAPY | Age: 35
Setting detail: THERAPIES SERIES
Discharge: HOME | End: 2020-11-18
Payer: COMMERCIAL

## 2020-11-18 ENCOUNTER — HOSPITAL ENCOUNTER (OUTPATIENT)
Age: 35
Discharge: HOME | End: 2020-11-20
Payer: COMMERCIAL

## 2020-11-18 PROCEDURE — U0003 INFECTIOUS AGENT DETECTION BY NUCLEIC ACID (DNA OR RNA); SEVERE ACUTE RESPIRATORY SYNDROME CORONAVIRUS 2 (SARS-COV-2) (CORONAVIRUS DISEASE [COVID-19]), AMPLIFIED PROBE TECHNIQUE, MAKING USE OF HIGH THROUGHPUT TECHNOLOGIES AS DESCRIBED BY CMS-2020-01-R: HCPCS

## 2020-11-19 LAB — SARS-COV-2, PCR: NOT DETECTED

## 2020-11-19 NOTE — PROGRESS NOTES
Have you been tested for COVID  Yes           Have you been told you were positive for COVID No  Have you had any known exposure to someone that is positive for COVID No  Do you have a cough                   No              Do you have shortness of breath No                 Do you have a sore throat            No                Are you having chills                    No                Are you having muscle aches. No                    Please come to the hospital wearing a mask and have your significant other wear a mask as well. Both of you should check your temperature before leaving to come here,  if it is 100 or higher please call [phone] for instruction.

## 2020-11-23 ENCOUNTER — HOSPITAL ENCOUNTER (OUTPATIENT)
Dept: GENERAL RADIOLOGY | Age: 35
Setting detail: OUTPATIENT SURGERY
Discharge: HOME | End: 2020-11-25
Attending: ANESTHESIOLOGY
Payer: COMMERCIAL

## 2020-11-23 ENCOUNTER — HOSPITAL ENCOUNTER (OUTPATIENT)
Age: 35
Setting detail: OUTPATIENT SURGERY
Discharge: HOME | End: 2020-11-23
Attending: ANESTHESIOLOGY | Admitting: ANESTHESIOLOGY
Payer: COMMERCIAL

## 2020-11-23 VITALS
WEIGHT: 135 LBS | HEART RATE: 57 BPM | SYSTOLIC BLOOD PRESSURE: 115 MMHG | RESPIRATION RATE: 16 BRPM | DIASTOLIC BLOOD PRESSURE: 61 MMHG | TEMPERATURE: 97 F | BODY MASS INDEX: 24.84 KG/M2 | OXYGEN SATURATION: 100 % | HEIGHT: 62 IN

## 2020-11-23 LAB — HCG(URINE) PREGNANCY TEST: NEGATIVE

## 2020-11-23 PROCEDURE — 3209999900 FLUORO FOR SURGICAL PROCEDURES

## 2020-11-23 PROCEDURE — 7100000011 HC PHASE II RECOVERY - ADDTL 15 MIN: Performed by: ANESTHESIOLOGY

## 2020-11-23 PROCEDURE — 62323 NJX INTERLAMINAR LMBR/SAC: CPT | Performed by: ANESTHESIOLOGY

## 2020-11-23 PROCEDURE — 7100000010 HC PHASE II RECOVERY - FIRST 15 MIN: Performed by: ANESTHESIOLOGY

## 2020-11-23 PROCEDURE — 6360000002 HC RX W HCPCS: Performed by: ANESTHESIOLOGY

## 2020-11-23 PROCEDURE — 2500000003 HC RX 250 WO HCPCS: Performed by: ANESTHESIOLOGY

## 2020-11-23 PROCEDURE — 6360000004 HC RX CONTRAST MEDICATION: Performed by: ANESTHESIOLOGY

## 2020-11-23 PROCEDURE — 81025 URINE PREGNANCY TEST: CPT

## 2020-11-23 PROCEDURE — 3600000012 HC SURGERY LEVEL 2 ADDTL 15MIN: Performed by: ANESTHESIOLOGY

## 2020-11-23 PROCEDURE — 2709999900 HC NON-CHARGEABLE SUPPLY: Performed by: ANESTHESIOLOGY

## 2020-11-23 PROCEDURE — 3600000002 HC SURGERY LEVEL 2 BASE: Performed by: ANESTHESIOLOGY

## 2020-11-23 RX ORDER — METHYLPREDNISOLONE ACETATE 40 MG/ML
INJECTION, SUSPENSION INTRA-ARTICULAR; INTRALESIONAL; INTRAMUSCULAR; SOFT TISSUE PRN
Status: DISCONTINUED | OUTPATIENT
Start: 2020-11-23 | End: 2020-11-23 | Stop reason: ALTCHOICE

## 2020-11-23 RX ORDER — LIDOCAINE HYDROCHLORIDE 5 MG/ML
INJECTION, SOLUTION INFILTRATION; INTRAVENOUS PRN
Status: DISCONTINUED | OUTPATIENT
Start: 2020-11-23 | End: 2020-11-23 | Stop reason: ALTCHOICE

## 2020-11-23 NOTE — H&P
[facility]        [address] [facility], [address]  Dept: [phone]    Procedure History & Physical      Enrique Jaramilloshorty     HPI:    Patient  is here for [facility]  for low back pain. Labs/imaging studies reviewed   All question and concerns addressed including R/B/A associated with the procedure    Past Medical History:   Diagnosis Date    Back pain     GERD (gastroesophageal reflux disease)     Hyperlipidemia     not on any medications    Hypothyroidism     Thyroid disease        Past Surgical History:   Procedure Laterality Date    EYE SURGERY         Prior to Admission medications    Medication Sig Start Date End Date Taking?  Authorizing Provider   ibuprofen (ADVIL;MOTRIN) 800 MG tablet Take 800 mg by mouth every 6 hours as needed for Pain   Yes Historical Provider, MD   levothyroxine (SYNTHROID) 50 MCG tablet Take 1 tablet by mouth daily 7/10/20  Yes Debby Ramey, DO   Multiple Vitamins-Minerals (MULTIVITAMIN ADULT) TABS Take by mouth   Yes Historical Provider, MD   etonogestrel-ethinyl estradiol (NUVARING) 0.12-0.015 MG/24HR vaginal ring CHANGE EVERY 3 WEEKS 10/3/20   Historical Provider, MD   EPINEPHrine (EPIPEN 2-JOSE) 0.3 MG/0.3ML SOAJ injection Inject 0.3 mLs into the muscle once for 1 dose Use as directed for allergic reaction 6/16/20 6/16/20  Debby Ramey, DO       Allergies   Allergen Reactions    Bee Venom Anaphylaxis       Social History     Socioeconomic History    Marital status:      Spouse name: Not on file    Number of children: Not on file    Years of education: Not on file    Highest education level: Not on file   Occupational History    Not on file   Social Needs    Financial resource strain: Not on file    Food insecurity     Worry: Not on file     Inability: Not on file    Transportation needs     Medical: Not on file     Non-medical: Not on file   Tobacco Use    Smoking status: Current Some Day Smoker    Smokeless tobacco: Never Used regular rate and rhythm    ABDOMEN:  Soft non tender non distended     EXTREMITIES: no signs of clubbing or cyanosis. MUSCULOSKELETAL: negative for flaccid muscle tone or spastic movements. SKIN: gross examination reveals no signs of rashes, or diaphoresis. NEURO: Cranial nerves II-XII grossly intact. No signs of agitated mood. Assessment/Plan:    Patient  is here for [facility]  for low back pain. The patient was counseled at length about the risks of cynthia Covid-19 during their perioperative period and any recovery window from their procedure. The patient was made aware that cynthia Covid-19  may worsen their prognosis for recovering from their procedure  and lend to a higher morbidity and/or mortality risk. All material risks, benefits, and reasonable alternatives including postponing the procedure were discussed. The patient does wish to proceed with the procedure at this time.       Rossy Ledezma MD

## 2020-11-23 NOTE — OP NOTE
Operative Note      Patient: Alisa Webster  YOB: 1985  MRN: [id number]    Date of Procedure: 2020    Pre-Op Diagnosis: DEGENERATIVE DISC DISEASE, Lumbar radicular pain. Post-Op Diagnosis: Same       Procedure(s):  LUMBAR EPIDURAL STEROID INJECTION UNDER FLUOROSCOPIC GUIDANCE AT L5-S1     Surgeon(s):  Rossy Ledezma MD    Assistant:   * No surgical staff found *    Anesthesia: None    Estimated Blood Loss (mL): Minimal    Complications: None    Specimens:   * No specimens in log *    Implants:  * No implants in log *      Drains: * No LDAs found *    Findings: good needle placement    Detailed Description of Procedure:   2020    Patient: Alisa Webster  :  1985  Age:  38 y.o. Sex:  female     PRE-OPERATIVE DIAGNOSIS: Lumbar disc displacement, lumbar radiculopathy. POST-OPERATIVE DIAGNOSIS: Same. PROCEDURE: Fluoroscopic guided therapeutic lumbar epidural steroid injection at the L5-S1 level (# 1). SURGEON: Rossy Ledezma MD    ANESTHESIA: local    ESTIMATED BLOOD LOSS: None.  ______________________________________________________________________    BRIEF HISTORY:  Alisa Webster comes in today for first lumbar epidural injection at L5-S1 level. The potential complications of this procedure were discussed with her again today. She has elected to undergo the aforementioned procedure. Iraj complete History & Physical examination were reviewed in depth, a copy of which is in the chart. DESCRIPTION OF PROCEDURE:    After confirming written and informed consent, a time-out was performed and Iraj name and date of birth, the procedure to be performed as well as the plan for the location of the needle insertion were confirmed. The patient was brought into the procedure room and placed in the prone position on the fluoroscopy table. A pillow was placed under the patient's lower abdomen/upper pelvis to increase lumbar interlaminar space.  Standard monitors were placed, and vital signs were observed throughout the procedure. The area of the lumbar spine was prepped with chloraprep and draped in a sterile manner. The L5-S1 interspace was identified and marked under AP fluoroscopy. The skin and subcutaneous tissues at the above level were anesthestized with 0.5% lidocaine. With intermittent fluoroscopy, an # 18 gauge 3-1/2 tuohy epidural needle was inserted and directed toward the interlaminar space. The needle was slowly advanced using loss of resistance technique and 5 cc glass syringe  until the tip of the epidural needle has passed through the ligamentum flavum and entered the epidural space. AP and lateral fluoroscopic imaging is performed to verify that the epidural needle is properly placed. Negative aspiration of blood and CSF was confirmed. 1 ml of omnipaque 240 was used for confirmation of even epidural spread under both live and AP fluoroscopy. After negative aspiration, a solution of 0.5 % Lidocaine 3 ml and 60 mg DepoMedrol was easily injected. The needle was gently removed intact . The patient back was cleaned and a Band-Aid was placed over the needle insertion point. Disposition the patient tolerated the procedure well and there were no complications . Vital signs remained stable throughout the procedure. The patient was escorted to the recovery area where they remained until discharge and written discharge instructions for the procedure were given. Plan: [place] Person will return to our pain management center as scheduled.      Minnie Serrano MD

## 2020-12-28 ENCOUNTER — VIRTUAL VISIT (OUTPATIENT)
Dept: PAIN MANAGEMENT | Age: 35
End: 2020-12-28
Payer: COMMERCIAL

## 2020-12-28 ENCOUNTER — PREP FOR PROCEDURE (OUTPATIENT)
Dept: PAIN MANAGEMENT | Age: 35
End: 2020-12-28

## 2020-12-28 ENCOUNTER — TELEPHONE (OUTPATIENT)
Dept: PAIN MANAGEMENT | Age: 35
End: 2020-12-28

## 2020-12-28 PROCEDURE — G8428 CUR MEDS NOT DOCUMENT: HCPCS | Performed by: ANESTHESIOLOGY

## 2020-12-28 PROCEDURE — 99213 OFFICE O/P EST LOW 20 MIN: CPT | Performed by: ANESTHESIOLOGY

## 2020-12-28 NOTE — TELEPHONE ENCOUNTER
Call to Paula Mariee that procedure was approved for 1/7/2021 and that the surgery center should call her a few days before for the pre op call and after 3:00 PM the business day before with the arrival time. Instructed Adela to hold ibuprofen for 24 hours, naprosyn for 4 days and any aspirin containing products for 7 days. Instructed to call office back if any questions. Instructed of need for COVID-19 testing and self quarantining. Adela verbalized understanding.     Rose Marie Akbar 's response to the following screening questions:    Fever: No  Headache:  No  Cough: No  Shortness of breath: No  Exposed to anyone with these symptoms: No

## 2020-12-28 NOTE — PROGRESS NOTES
[id number] [facility]  [phone]    Tele health follow up Note      Lyric Mckay     Date of Visit:  12/28/2020    CC:  Tele health follow up   Chief Complaint   Patient presents with    Pain     low back 7/10 with exertion  minimal at rest       Consent:  Telehealth Visit due to Deepak 19 pandemic  The patient and/or health care decision maker is aware that he/she may receive a bill for this tele-health service Doxy Me, depending on his insurance coverage, and has provided verbal consent to proceed: Yes  I have considered the risks of abuse, dependence, addiction and diversion. My patient is aware that they will need a follow-up visit (in-person or virtually) at the appropriate time indicated for continued medications. Further, my patient is aware that when this acute crisis has lifted, they will be expected to return for an in-person visit and all elements of standard local and hospital guidelines in order to continue this medication. Patient Location:Home   Physician Location: Other address in [place]  Who helped the patient with the visit: none  Time documentation: yes  Platform used:  doxShwrÃ¼m me      HPI:    H/o Chronic low back pain. Intermittently feels pain over the left LE. Failed conservative treatment. S/P LESI L5-S1 on 11/23/2020 with > 90% relief for few weeks. Now has recurrence fo similar pain but not as intense. Pain is better after injection but still bothers on activities and bending. Appropriate analgesia with current medications regimen: no.    Change in quality of symptoms:no. Medication side effects:none. Recent diagnostic testing:none. Recent interventional procedures:as detailed above.     Previous treatments:   Physical Therapy : yes, in the past 4-5 months- and continues HEP      Chiropractic treatment: yes     Medications: - NSAID's : yes                        - Membrane stabilizers : no                       - Opioids : no                       - Adjuvants or Others : yes Potential Aberrant Drug-Related Behavior: no     Urine Drug Screening: no    OARRS report:  Reviewed  12/28/20     Past Medical History: Reviewed    Past Surgical History: Reviewed     Home Medications: Reviewed    Allergies: Reviewed     Social History: Reviewed     REVIEW OF SYSTEMS:     Sheldon Cheek denies fever/chills, chest pain, shortness of breath, new bowel or bladder complaints. All other review of systems was negative. PHYSICAL EXAMINATION:      General:       A & O x3    HEENT:    Head:normocephalic and atraumatic    Lungs:    Breathing:Appears normal    Cervical spine:    Inspection:Normal  Range of motion:normal     Thoracic spine:    Range of motion:normal     Lumbar spine:    Range of motion:reduced moderately Lateral bending, flexion, extension rotation bilateral and is somewhat painful. Extremities:    Tremors:None bilaterally upper and lower    Neurological:     Motor:          No apparent weakness per patient       [address] [place] [address]    Dermatology:    Skin:no unusual rashes    Assessment/Plan:       Diagnosis Orders   1. DDD (degenerative disc disease), lumbar      2. Sacroiliac dysfunction      3. Lumbosacral spondylosis without myelopathy            38 y.o.  female with H/o chronic low back pain and intermittent left thigh pain.     MRI findings reviewed personally and discussed with the patient: Mild DD at L4-5, L5-S1. No significant Neuroforaminal compromise or herniation.     Failed conservative treatment with PT/ Chiro/ meds. S/P LESI # 1 with > 90% relief for few weeks. Now has recurrence fo similar pain.     Plan:  Right L4 and Left L5 Transforaminal GENA under fluoroscopic guidance. Will add left SIJ injection if she has ongoing SIJ pain.  RBA discussed and patient agreed.     If axial low back pain persists, will consider lumbar facet MBNB/ RFA in future.     NSAID's for prn use.     PT/ HEP     Continue HEP.     Smoking cessation counseling.     Urine screen today: no F/U after the TFESI.     Patient advised regarding steps to help prevent the spread of COVID-19   SOURCE - https://june-destiny.info/. html        I affirm this is a Patient Initiated Episode with an established Patient who has not had a related appointment within my department in the past 7 days or scheduled within the next 24 hours. Total Time: > 15 mins including the time taken for counseling coordination of care, documentation.     Marilyn Rodrigez MD    CC:  Iain Regalado, DO

## 2020-12-28 NOTE — PROGRESS NOTES
Maci Beck was read the following message We want to confirm that, for purposes of billing, this is a virtual visit with your provider for which we will submit a claim for reimbursement with your insurance company. You will be responsible for any copays, coinsurance amounts or other amounts not covered by your insurance company. If you do not accept this, unfortunately we will not be able to schedule a virtual visit with the provider. Do you accept?  Nuno Pan responded YES

## 2020-12-29 NOTE — PROGRESS NOTES
[facility]  INSTRUCTIONS  . .......................................................................................................................................... [x] Parking the day of Surgery is located in the [facility]. Upon entering the door, someone will be there to greet you    [x]  Bring photo ID and insurance card     [x] You may have a light breakfast day of procedure    [x]  Wear loose comfortable clothing    [x]  Please follow instructions for medications as given per Dr's office     [x] Stop blood thinners as per Dr's office instructions    [x] You can expect a call the business day prior to procedure to notify you of your arrival time     [x] Please arrange for     []  Other instructions          Have you been tested for COVID  Yes           Have you been told you were positive for COVID No  Have you had any known exposure to someone that is positive for COVID No  Do you have a cough                   No              Do you have shortness of breath No                 Do you have a sore throat            No                Are you having chills                    No                Are you having muscle aches. No                    Please come to the hospital wearing a mask and have your significant other wear a mask as well. Both of you should check your temperature before leaving to come here,  if it is 100 or higher please call [phone] for instruction.

## 2020-12-31 ENCOUNTER — HOSPITAL ENCOUNTER (OUTPATIENT)
Age: 35
Discharge: HOME | End: 2021-01-02
Payer: COMMERCIAL

## 2020-12-31 PROCEDURE — U0003 INFECTIOUS AGENT DETECTION BY NUCLEIC ACID (DNA OR RNA); SEVERE ACUTE RESPIRATORY SYNDROME CORONAVIRUS 2 (SARS-COV-2) (CORONAVIRUS DISEASE [COVID-19]), AMPLIFIED PROBE TECHNIQUE, MAKING USE OF HIGH THROUGHPUT TECHNOLOGIES AS DESCRIBED BY CMS-2020-01-R: HCPCS

## 2021-01-01 DIAGNOSIS — Z01.818 PREOP TESTING: ICD-10-CM

## 2021-01-03 LAB
SARS-COV-2: NOT DETECTED
SOURCE: NORMAL

## 2021-01-07 ENCOUNTER — HOSPITAL ENCOUNTER (OUTPATIENT)
Age: 36
Setting detail: OUTPATIENT SURGERY
Discharge: HOME | End: 2021-01-07
Attending: ANESTHESIOLOGY | Admitting: ANESTHESIOLOGY
Payer: COMMERCIAL

## 2021-01-07 ENCOUNTER — HOSPITAL ENCOUNTER (OUTPATIENT)
Dept: GENERAL RADIOLOGY | Age: 36
Setting detail: OUTPATIENT SURGERY
Discharge: HOME | End: 2021-01-09
Attending: ANESTHESIOLOGY
Payer: COMMERCIAL

## 2021-01-07 VITALS
DIASTOLIC BLOOD PRESSURE: 68 MMHG | TEMPERATURE: 96.9 F | HEART RATE: 65 BPM | RESPIRATION RATE: 14 BRPM | HEIGHT: 63 IN | WEIGHT: 135 LBS | SYSTOLIC BLOOD PRESSURE: 110 MMHG | OXYGEN SATURATION: 100 % | BODY MASS INDEX: 23.92 KG/M2

## 2021-01-07 DIAGNOSIS — Z01.818 PREOP TESTING: Primary | ICD-10-CM

## 2021-01-07 DIAGNOSIS — R52 PAIN: ICD-10-CM

## 2021-01-07 LAB — HCG(URINE) PREGNANCY TEST: NEGATIVE

## 2021-01-07 PROCEDURE — 6360000002 HC RX W HCPCS: Performed by: ANESTHESIOLOGY

## 2021-01-07 PROCEDURE — 7100000010 HC PHASE II RECOVERY - FIRST 15 MIN: Performed by: ANESTHESIOLOGY

## 2021-01-07 PROCEDURE — 2500000003 HC RX 250 WO HCPCS: Performed by: ANESTHESIOLOGY

## 2021-01-07 PROCEDURE — 2709999900 HC NON-CHARGEABLE SUPPLY: Performed by: ANESTHESIOLOGY

## 2021-01-07 PROCEDURE — 3600000002 HC SURGERY LEVEL 2 BASE: Performed by: ANESTHESIOLOGY

## 2021-01-07 PROCEDURE — 3209999900 FLUORO FOR SURGICAL PROCEDURES

## 2021-01-07 PROCEDURE — 81025 URINE PREGNANCY TEST: CPT

## 2021-01-07 PROCEDURE — 6360000004 HC RX CONTRAST MEDICATION: Performed by: ANESTHESIOLOGY

## 2021-01-07 PROCEDURE — 64484 NJX AA&/STRD TFRM EPI L/S EA: CPT | Performed by: ANESTHESIOLOGY

## 2021-01-07 PROCEDURE — 64483 NJX AA&/STRD TFRM EPI L/S 1: CPT | Performed by: ANESTHESIOLOGY

## 2021-01-07 RX ORDER — LIDOCAINE HYDROCHLORIDE 5 MG/ML
INJECTION, SOLUTION INFILTRATION; INTRAVENOUS PRN
Status: DISCONTINUED | OUTPATIENT
Start: 2021-01-07 | End: 2021-01-07 | Stop reason: ALTCHOICE

## 2021-01-07 RX ORDER — METHYLPREDNISOLONE ACETATE 40 MG/ML
INJECTION, SUSPENSION INTRA-ARTICULAR; INTRALESIONAL; INTRAMUSCULAR; SOFT TISSUE PRN
Status: DISCONTINUED | OUTPATIENT
Start: 2021-01-07 | End: 2021-01-07 | Stop reason: ALTCHOICE

## 2021-01-07 NOTE — H&P
[facility]        [address] [facility]  Dept: [phone]    Procedure History & Physical      [facility]     HPI:    Patient  is here for Lumbar TFESI  for low back pain. Labs/imaging studies reviewed   All question and concerns addressed including R/B/A associated with the procedure    Past Medical History:   Diagnosis Date    Back pain     GERD (gastroesophageal reflux disease)     Hyperlipidemia     not on any medications    Hypothyroidism     Thyroid disease        Past Surgical History:   Procedure Laterality Date    EYE SURGERY      PAIN MANAGEMENT PROCEDURE Left 11/23/2020    LUMBAR EPIDURAL STEROID INJECTION UNDER FLUOROSCOPIC GUIDANCE AT L5-S1 LEFT PARAMEDIAN performed by Geovanna Cooney MD at [facility] OR       Prior to Admission medications    Medication Sig Start Date End Date Taking?  Authorizing Provider   ibuprofen (ADVIL;MOTRIN) 800 MG tablet Take 800 mg by mouth every 6 hours as needed for Pain Last dose 1-2-21   Yes Historical Provider, MD   etonogestrel-ethinyl estradiol (Leonor Bold) 0.12-0.015 MG/24HR vaginal ring CHANGE EVERY 3 WEEKS 10/3/20  Yes Historical Provider, MD   levothyroxine (SYNTHROID) 50 MCG tablet Take 1 tablet by mouth daily 7/10/20  Yes Risa Fish, DO   EPINEPHrine (EPIPEN 2-JOSE) 0.3 MG/0.3ML SOAJ injection Inject 0.3 mLs into the muscle once for 1 dose Use as directed for allergic reaction 6/16/20 12/29/20 Yes Risa Fish, DO   Multiple Vitamins-Minerals (MULTIVITAMIN ADULT) TABS Take by mouth   Yes Historical Provider, MD       Allergies   Allergen Reactions    Bee Venom Anaphylaxis       Social History     Socioeconomic History    Marital status:      Spouse name: Not on file    Number of children: Not on file    Years of education: Not on file    Highest education level: Not on file   Occupational History    Not on file   Social Needs    Financial resource strain: Not on file    Food insecurity Worry: Not on file     Inability: Not on file    Transportation needs     Medical: Not on file     Non-medical: Not on file   Tobacco Use    Smoking status: Former Smoker    Smokeless tobacco: Never Used   Substance and Sexual Activity    Alcohol use: Not Currently    Drug use: Not Currently    Sexual activity: Not on file   Lifestyle    Physical activity     Days per week: Not on file     Minutes per session: Not on file    Stress: Not on file   Relationships    Social connections     Talks on phone: Not on file     Gets together: Not on file     Attends Cheondoism service: Not on file     Active member of club or organization: Not on file     Attends meetings of clubs or organizations: Not on file     Relationship status: Not on file    Intimate partner violence     Fear of current or ex partner: Not on file     Emotionally abused: Not on file     Physically abused: Not on file     Forced sexual activity: Not on file   Other Topics Concern    Not on file   Social History Narrative    Not on file       Family History   Problem Relation Age of Onset    Cancer Father     Hypertension Mother          REVIEW OF SYSTEMS:    CONSTITUTIONAL:  negative for  fevers, chills, sweats and fatigue    RESPIRATORY:  negative for  dry cough, cough with sputum, dyspnea, wheezing and chest pain    CARDIOVASCULAR:  negative for chest pain, dyspnea, palpitations, syncope    GASTROINTESTINAL:  negative for nausea, vomiting, change in bowel habits, diarrhea, constipation and abdominal pain    MUSCULOSKELETAL: negative for muscle weakness    SKIN: negative for itching or rashes.     BEHAVIOR/PSYCH:  negative for poor appetite, increased appetite, decreased sleep and poor concentration    All other systems negative      PHYSICAL EXAM:    VITALS:  Ht 5' 3\" (1.6 m)   Wt 135 lb (61.2 kg)   LMP 12/31/2020   BMI 23.91 kg/m²     CONSTITUTIONAL:  awake, alert, cooperative, no apparent distress, and appears stated age    EYES: PERRLA, EOMI    LUNGS:  No increased work of breathing, no audible wheezing    CARDIOVASCULAR:  regular rate and rhythm    ABDOMEN:  Soft non tender non distended     EXTREMITIES: no signs of clubbing or cyanosis. MUSCULOSKELETAL: negative for flaccid muscle tone or spastic movements. SKIN: gross examination reveals no signs of rashes, or diaphoresis. NEURO: Cranial nerves II-XII grossly intact. No signs of agitated mood. Assessment/Plan:    Patient  is here for Lumbar TFESI  for low back pain. The patient was counseled at length about the risks of cynthia Covid-19 during their perioperative period and any recovery window from their procedure. The patient was made aware that cynthia Covid-19  may worsen their prognosis for recovering from their procedure  and lend to a higher morbidity and/or mortality risk. All material risks, benefits, and reasonable alternatives including postponing the procedure were discussed. The patient does wish to proceed with the procedure at this time.     Lona Pablo MD

## 2021-01-07 NOTE — OP NOTE
Operative Note      Patient: Maile Shafer  YOB: 1985  MRN: [id number]    Date of Procedure: 2021    Pre-Op Diagnosis: DEGENERATIVE DISC DISEASE LUMBAR    Post-Op Diagnosis: Same       Procedure(s):  LUMBAR TRANSFORAMINAL EPIDURAL STEROID INJECTION RIGHT L4 AND LEFT L5 UNDER FLUORO        Surgeon(s):  Mimi Canales MD    Assistant:   * No surgical staff found *    Anesthesia: None    Estimated Blood Loss (mL): Minimal    Complications: None    Specimens:   * No specimens in log *    Implants:  * No implants in log *      Drains: * No LDAs found *    Findings: good needle placement    Detailed Description of Procedure:   2021    Patient: Maile Shafer  :  1985  Age:  38 y.o. Sex:  female     PRE-OPERATIVE DIAGNOSIS: Lumbar disc displacement, lumbar neural foraminal stenosis, lumbar radiculopathy. POST-OPERATIVE DIAGNOSIS: Same. PROCEDURE: Lumbar Transforaminal epidural steroid injection under fluoroscopic guidance at foraminal level right L4 and left L5 .    SURGEON: Mimi Canales MD    ANESTHESIA: Local    ESTIMATED BLOOD LOSS: None.  ______________________________________________________________________  BRIEF HISTORY: Maile Shafer comes in today for the lumbar  transforaminal epidural steroid injection under fluoroscopic guidance at foraminal level Right L4 and Left L5 . The potential complications of this procedure were discussed with her again today. She has elected to undergo the aforementioned procedure. Iraj complete History & Physical examination were reviewed in depth, a copy of which is in the chart. DESCRIPTION OF PROCEDURE:    After confirming written and informed consent, a time-out was performed and Iraj name and date of birth, the procedure to be performed as well as the plan for the location of the needle insertion were confirmed. The patient was brought into the procedure room and placed in the prone position on the fluoroscopy table. Standard monitors were placed and vital signs were observed throughout the procedure. The area of the lumbar spine was prepped with chloraprep and draped in a sterile manner. The vertebral body was identified with AP fluoroscopy. An oblique view was obtained to better visualize the inferior junction of the pedicle and transverse process . The 6 o'clock position of the pedicle was marked and identified. The skin and subcutaneous tissue were anesthetized with 0.5% lidocaine. Two # 22 gauge 3.5 inch pencil point needle was directed toward the targeted point under fluoroscopy until bone was contacted. The needle was then walked inferiorly until the neural foramen was entered . A lateral fluoroscopic view was then used to place the needle tip in the middle of the foramen. Negative aspiration was confirmed for blood and CSF and 1 cc of Omnipaque 240 contrast was injected at each level under live fluoroscopy. Appropriate neurograms were observed under AP fluoroscopy. Then after negative aspiration, a solution of the 3 cc of 0.5% lidocaine and 30 mg DepoMedrol was easily injected at each level. The needles were removed with constant aspiration technique. The patient back was cleaned and a bandage was placed over the needle insertion points    Disposition the patient tolerated the procedure well and there were no complications . Vital signs remained stable throughout the procedure. The patient was escorted to the recovery area where they remained until discharge and written discharge instructions for the procedure were given. Plan: Ema Olson will return to our pain management center as scheduled.      Neli Conti MD

## 2021-01-14 ENCOUNTER — HOSPITAL ENCOUNTER (OUTPATIENT)
Age: 36
Discharge: HOME | End: 2021-01-14
Payer: COMMERCIAL

## 2021-01-14 DIAGNOSIS — E03.9 ACQUIRED HYPOTHYROIDISM: ICD-10-CM

## 2021-01-14 LAB — TSH SERPL DL<=0.05 MIU/L-ACNC: 17.09 UIU/ML (ref 0.27–4.2)

## 2021-01-14 PROCEDURE — 36415 COLL VENOUS BLD VENIPUNCTURE: CPT

## 2021-01-14 PROCEDURE — 84443 ASSAY THYROID STIM HORMONE: CPT

## 2021-01-14 PROCEDURE — 84439 ASSAY OF FREE THYROXINE: CPT

## 2021-01-15 LAB — T4 FREE: 0.91 NG/DL (ref 0.93–1.7)

## 2021-01-27 ENCOUNTER — OFFICE VISIT (OUTPATIENT)
Dept: PAIN MANAGEMENT | Age: 36
End: 2021-01-27
Payer: COMMERCIAL

## 2021-01-27 VITALS
WEIGHT: 135 LBS | RESPIRATION RATE: 18 BRPM | BODY MASS INDEX: 24.84 KG/M2 | TEMPERATURE: 98 F | SYSTOLIC BLOOD PRESSURE: 102 MMHG | DIASTOLIC BLOOD PRESSURE: 68 MMHG | HEIGHT: 62 IN | HEART RATE: 72 BPM | OXYGEN SATURATION: 99 %

## 2021-01-27 DIAGNOSIS — M51.36 DDD (DEGENERATIVE DISC DISEASE), LUMBAR: Primary | ICD-10-CM

## 2021-01-27 DIAGNOSIS — M47.817 LUMBOSACRAL SPONDYLOSIS WITHOUT MYELOPATHY: ICD-10-CM

## 2021-01-27 PROCEDURE — 99213 OFFICE O/P EST LOW 20 MIN: CPT | Performed by: ANESTHESIOLOGY

## 2021-01-27 PROCEDURE — 1036F TOBACCO NON-USER: CPT | Performed by: ANESTHESIOLOGY

## 2021-01-27 PROCEDURE — G8427 DOCREV CUR MEDS BY ELIG CLIN: HCPCS | Performed by: ANESTHESIOLOGY

## 2021-01-27 PROCEDURE — G8420 CALC BMI NORM PARAMETERS: HCPCS | Performed by: ANESTHESIOLOGY

## 2021-01-27 PROCEDURE — G8484 FLU IMMUNIZE NO ADMIN: HCPCS | Performed by: ANESTHESIOLOGY

## 2021-01-27 RX ORDER — FLUCONAZOLE 100 MG/1
TABLET ORAL
COMMUNITY
Start: 2021-01-25 | End: 2021-05-18

## 2021-01-27 NOTE — PROGRESS NOTES
[facility]        [address] [facility], [facility] [address]  Dept: [phone]        Follow up Note      Mandy Little     Date of Visit:  1/27/2021    CC:  Patient presents for follow up   Chief Complaint   Patient presents with    Follow-up     lower back    Follow Up After Procedure     LUMBAR TRANSFORAMINAL EPIDURAL STEROID INJECTION RIGHT L4 AND LEFT L5 UNDER FLUORO           HPI:    Low back pain. S/P LESI on 11.23.2020 with > 90% relief for few weeks. S/P TFESI on 1/7/2021- helped > 50 %    Continues Axial low back pain on excessive activities. .    Denies LE radiation. Continues to do HEP as tolerated. Pain is better. Change in quality of symptoms:no. Medication side effects:none. Recent diagnostic testing:none. Recent interventional procedures:as detailed above.   Previous treatments:   Physical Therapy : yes, in the past 4-5 months- and continues HEP      Chiropractic treatment: yes     Medications: - NSAID's : yes                        - Membrane stabilizers : no                       - Opioids : no                       - Adjuvants or Others : yes     TENS Unit: no     Surgeries: no LS spine surgery     She has not been on anticoagulation medications       She has not been on herbal supplements.       She is not diabetic.     Imaging:      Mri of LS spine: 7/13/2020:      FINDINGS:    BONES/ALIGNMENT: There is normal alignment of the lumbar spine.  The    vertebral body heights are maintained.  There is no fracture or destructive    osseous lesion.  There is degenerative disc disease with disc desiccation and    endplate changes, most pronounced at L4-5.  The intervertebral disc heights    are grossly maintained.         SPINAL CORD: The conus terminates normally.         SOFT TISSUES: No paraspinal mass identified.         L1-L2: There is no significant disc herniation, spinal canal stenosis or    neural foraminal narrowing.      L2-L3: There is no significant disc herniation, spinal canal stenosis or    neural foraminal narrowing.         L3-L4: There is no significant disc herniation, spinal canal stenosis or    neural foraminal narrowing.         L4-L5: There is disc bulge, superimposed central disc protrusion, with mild    right foraminal narrowing.  No spinal canal narrowing.         L5-S1: There is disc bulge, annular fissure with minimal right foraminal    narrowing.  No spinal canal narrowing.              Impression    Degenerative disc disease as described above, without spinal canal narrowing.         Foraminal narrowing, mild at right L4-5, minimal at right L5-S1.       Xray LS spine: 6/17/2020:    RESULT: 3 views of the lumbar spine were obtained. Balinda Calico are 5 lumbar   vertebral bodies which are of normal alignment.  I see no fracture or   destructive process.  There is mild degenerative changes involving L4-S1   level with marked disc space narrowing. IMPRESSION  IMPRESSION: MILD DEGENERATIVE CHANGES MARKED DISC SPACE NARROWING LOWER   LUMBAR SPINE                                        Potential Aberrant Drug-Related Behavior: no      Urine Drug Screening: no     OARRS report: Reviewed   12/28/20 1/27/2021: not on chronic opioids.       Past Medical History:   Diagnosis Date    Back pain     GERD (gastroesophageal reflux disease)     Hyperlipidemia     not on any medications    Hypothyroidism     Thyroid disease        Past Surgical History:   Procedure Laterality Date    EYE SURGERY      PAIN MANAGEMENT PROCEDURE Left 11/23/2020    LUMBAR EPIDURAL STEROID INJECTION UNDER FLUOROSCOPIC GUIDANCE AT L5-S1 LEFT PARAMEDIAN performed by Mimi Canales MD at [facility] OR    PAIN MANAGEMENT PROCEDURE Bilateral 1/7/2021    LUMBAR TRANSFORAMINAL EPIDURAL STEROID INJECTION RIGHT L4 AND LEFT L5 UNDER FLUORO performed by Mimi Canales MD at [facility] OR       Prior to Admission medications Medication Sig Start Date End Date Taking?  Authorizing Provider   fluconazole (DIFLUCAN) 100 MG tablet TAKE ONE TABLET BY MOUTH EVERY DAY FOR 5 DAYS 1/25/21   Historical Provider, MD   ibuprofen (ADVIL;MOTRIN) 800 MG tablet Take 800 mg by mouth every 6 hours as needed for Pain Last dose 1-2-21    Historical Provider, MD   etonogestrel-ethinyl estradiol (Van Nap) 0.12-0.015 MG/24HR vaginal ring CHANGE EVERY 3 WEEKS 10/3/20   Historical Provider, MD   levothyroxine (SYNTHROID) 50 MCG tablet Take 1 tablet by mouth daily 7/10/20   Daisy Bonilla, DO   EPINEPHrine (EPIPEN 2-JOSE) 0.3 MG/0.3ML SOAJ injection Inject 0.3 mLs into the muscle once for 1 dose Use as directed for allergic reaction 6/16/20 12/29/20  Daisy Bonilla, DO   Multiple Vitamins-Minerals (MULTIVITAMIN ADULT) TABS Take by mouth    Historical Provider, MD       Allergies   Allergen Reactions    Bee Venom Anaphylaxis       Social History     Socioeconomic History    Marital status:      Spouse name: Not on file    Number of children: Not on file    Years of education: Not on file    Highest education level: Not on file   Occupational History    Not on file   Social Needs    Financial resource strain: Not on file    Food insecurity     Worry: Not on file     Inability: Not on file    Transportation needs     Medical: Not on file     Non-medical: Not on file   Tobacco Use    Smoking status: Former Smoker    Smokeless tobacco: Never Used   Substance and Sexual Activity    Alcohol use: Not Currently    Drug use: Not Currently    Sexual activity: Not on file   Lifestyle    Physical activity     Days per week: Not on file     Minutes per session: Not on file    Stress: Not on file   Relationships    Social connections     Talks on phone: Not on file     Gets together: Not on file     Attends Gnosticism service: Not on file     Active member of club or organization: Not on file Attends meetings of clubs or organizations: Not on file     Relationship status: Not on file    Intimate partner violence     Fear of current or ex partner: Not on file     Emotionally abused: Not on file     Physically abused: Not on file     Forced sexual activity: Not on file   Other Topics Concern    Not on file   Social History Narrative    Not on file       Family History   Problem Relation Age of Onset    Cancer Father     Hypertension Mother        REVIEW OF SYSTEMS:     Steven denies fever/chills, chest pain, shortness of breath, new bowel or bladder complaints. All other review of systems was negative. PHYSICAL EXAMINATION:      /68   Pulse 72   Temp 98 °F (36.7 °C) (Infrared)   Resp 18   Ht 5' 2\" (1.575 m)   Wt 135 lb (61.2 kg)   LMP 12/31/2020   SpO2 99%   BMI 24.69 kg/m²     General:        A & O x3     HEENT:     Head:normocephalic and atraumatic     Lungs:     Breathing:Appears normal     Cervical spine:     Inspection:Normal  Range of motion:normal      Thoracic spine:     Range of motion:normal      Lumbar spine:     Range of motion:reduced moderately Lateral bending, flexion, extension rotation bilateral and is painful. Lumbar facet loading + bilaterally.     Extremities:     Tremors:None bilaterally upper and lower     Neurological:     Sensory & Motor: No focal deficits       Dermatology:     Skin:no unusual rashes      Assessment/Plan:     Diagnosis Orders   1. DDD (degenerative disc disease), lumbar  [facility], [place], [facility]   2. Lumbosacral spondylosis without myelopathy  [facility], [place], [facility]     H/o chronic low back pain. Failed conservative treatment    MRI:  Mild DD at L4-5, L5-S1. No significant Neuroforaminal compromise or herniation. S/P GENA & TFESI with very good pain relief. Intermittent axial Low back pain on excessive activities. Features of facet pain.     PT    Relafen for prn use Has Zanaflex for prn use muscle spasm. IF pain persists, consider lumbar facet MBNB. Counseling reg: HEP and appropriate medication use.     Radha Flannery MD    CC:  Marek Dwyer DO

## 2021-01-29 ENCOUNTER — HOSPITAL ENCOUNTER (OUTPATIENT)
Dept: PHYSICAL THERAPY | Age: 36
Setting detail: THERAPIES SERIES
End: 2021-01-29
Payer: COMMERCIAL

## 2021-02-03 ENCOUNTER — HOSPITAL ENCOUNTER (OUTPATIENT)
Age: 36
Discharge: HOME | End: 2021-02-03
Payer: COMMERCIAL

## 2021-02-03 ENCOUNTER — TELEPHONE (OUTPATIENT)
Dept: PAIN MANAGEMENT | Age: 36
End: 2021-02-03

## 2021-02-03 DIAGNOSIS — E83.42 HYPOMAGNESEMIA: ICD-10-CM

## 2021-02-03 DIAGNOSIS — M79.10 MYALGIA: ICD-10-CM

## 2021-02-03 DIAGNOSIS — E55.9 VITAMIN D DEFICIENCY: ICD-10-CM

## 2021-02-03 LAB
ANION GAP SERPL CALCULATED.3IONS-SCNC: 8 MMOL/L (ref 7–16)
BUN BLDV-MCNC: 8 MG/DL (ref 6–20)
CALCIUM SERPL-MCNC: 9.3 MG/DL (ref 8.6–10.2)
CHLORIDE BLD-SCNC: 105 MMOL/L (ref 98–107)
CO2: 26 MMOL/L (ref 22–29)
CREAT SERPL-MCNC: 0.9 MG/DL (ref 0.5–1)
GFR AFRICAN AMERICAN: >60
GFR NON-AFRICAN AMERICAN: >60 ML/MIN/1.73
GLUCOSE BLD-MCNC: 87 MG/DL (ref 74–99)
MAGNESIUM: 1.8 MG/DL (ref 1.6–2.6)
POTASSIUM SERPL-SCNC: 4.1 MMOL/L (ref 3.5–5)
SODIUM BLD-SCNC: 139 MMOL/L (ref 132–146)
TOTAL CK: 251 U/L (ref 20–180)
VITAMIN D 25-HYDROXY: 34 NG/ML (ref 30–100)

## 2021-02-03 PROCEDURE — 83735 ASSAY OF MAGNESIUM: CPT

## 2021-02-03 PROCEDURE — 82306 VITAMIN D 25 HYDROXY: CPT

## 2021-02-03 PROCEDURE — 80048 BASIC METABOLIC PNL TOTAL CA: CPT

## 2021-02-03 PROCEDURE — 82550 ASSAY OF CK (CPK): CPT

## 2021-02-03 PROCEDURE — 36415 COLL VENOUS BLD VENIPUNCTURE: CPT

## 2021-02-03 RX ORDER — NABUMETONE 750 MG/1
750 TABLET, FILM COATED ORAL 2 TIMES DAILY
Qty: 40 TABLET | Refills: 1 | Status: SHIPPED | OUTPATIENT
Start: 2021-02-03 | End: 2021-05-18 | Stop reason: ALTCHOICE

## 2021-02-05 ENCOUNTER — HOSPITAL ENCOUNTER (OUTPATIENT)
Dept: PHYSICAL THERAPY | Age: 36
Setting detail: THERAPIES SERIES
Discharge: HOME | End: 2021-02-05
Payer: COMMERCIAL

## 2021-02-05 PROCEDURE — 97161 PT EVAL LOW COMPLEX 20 MIN: CPT | Performed by: PHYSICAL THERAPIST

## 2021-02-05 ASSESSMENT — PAIN DESCRIPTION - DESCRIPTORS: DESCRIPTORS: ACHING;SHARP

## 2021-02-05 ASSESSMENT — PAIN DESCRIPTION - ORIENTATION: ORIENTATION: LOWER

## 2021-02-05 ASSESSMENT — PAIN SCALES - GENERAL: PAINLEVEL_OUTOF10: 7

## 2021-02-05 ASSESSMENT — PAIN DESCRIPTION - LOCATION: LOCATION: BACK

## 2021-02-05 NOTE — PROGRESS NOTES
Physical Therapy  Initial Assessment  Date: 2021  Patient Name: Cindy Real  MRN: [id number]  : 1985          Restrictions       Subjective   General  Chart Reviewed: Yes  Referring Practitioner: Giovanna  Diagnosis: DDD lumbar region  Follows Commands: Within Functional Limits  PT Visit Information  PT Insurance Information: [employer]osusie  Total # of Visits to Date: 1  Subjective  Subjective: Patient presents to PT with c/o increased back pain for approx 2 years. States working as [employer] with generalized increase in pain throughout years. She had MRI 2020 with bulging disc at L4-5 and L5-S1. Pain Screening  Patient Currently in Pain: Yes  Pain Assessment  Pain Assessment: 0-10  Pain Level: 7  Pain Location: Back  Pain Orientation: Lower  Pain Descriptors: Aching; Sharp  Pain Frequency: Continuous  Vital Signs  Patient Currently in Pain: Yes    Objective     Observation/Palpation  Posture: Fair(rounded shoulder posturing)  Palpation: pain with palpation across lower lumbar/B SI region  Edema: no obvious edema    AROM RLE (degrees)  RLE AROM: WFL  AROM LLE (degrees)  LLE AROM : WFL  Spine  Lumbar: WFL    Strength RLE  Strength RLE: WFL  Strength LLE  Strength LLE: WFL  Strength Other  Other: Trunk- grossly 3+/5     Additional Measures  Special Tests: repeated movement: mild pain across central low back with both flex/ext motions  Sensation  Overall Sensation Status: WFL             Ambulation  Ambulation?: Yes  Ambulation 1  Surface: level tile  Device: No Device  Assistance: Independent  Balance  Sitting - Static: Good  Sitting - Dynamic: Good  Standing - Static: Good  Standing - Dynamic: Good     Assessment   Conditions Requiring Skilled Therapeutic Intervention  Body structures, Functions, Activity limitations: Decreased strength;Decreased posture; Increased pain  Assessment: Patient presents to PT with c/o back pain; AROM of trunk is [facility] however limited trunk strength with hindered posturing  Prognosis: Fair;Good  Decision Making: Low Complexity  REQUIRES PT FOLLOW UP: Yes         Plan   Plan  Times per week: 2x/week x 6 weeks  Current Treatment Recommendations: Strengthening, ROM, Manual Therapy - Soft Tissue Mobilization, Pain Management, Home Exercise Program, Safety Education & Training, Patient/Caregiver Education & Training, Modalities         Goals  Short term goals  Time Frame for Short term goals: 3 weeks  Short term goal 1: increase strength of trunk to grossly 4/5 improving trunk stabilization and overall posturing to Dumont Sachs term goal 2: decrease pain to < 5/10 across back region with activity  Long term goals  Time Frame for Long term goals : 6  weeks  Long term goal 1: increase strength of trunk to grossly 4+/5 improving trunk stabilization and overall posturing to GOOD-  Long term goal 2: decrease pain to < 3/10 across back region with activity  Long term goal 3: pt demonstrates independence with HEP  Patient Goals   Patient goals : to reduce back pain       Therapy Time   Individual Concurrent Group Co-treatment   Time In 1015         Time Out 1105         Minutes 50                 Biagio Pallas, PT

## 2021-04-22 ENCOUNTER — PREP FOR PROCEDURE (OUTPATIENT)
Dept: PAIN MANAGEMENT | Age: 36
End: 2021-04-22

## 2021-04-22 ENCOUNTER — OFFICE VISIT (OUTPATIENT)
Dept: PAIN MANAGEMENT | Age: 36
End: 2021-04-22
Payer: COMMERCIAL

## 2021-04-22 VITALS
HEIGHT: 62 IN | WEIGHT: 135 LBS | OXYGEN SATURATION: 96 % | SYSTOLIC BLOOD PRESSURE: 110 MMHG | BODY MASS INDEX: 24.84 KG/M2 | TEMPERATURE: 98 F | HEART RATE: 58 BPM | RESPIRATION RATE: 16 BRPM | DIASTOLIC BLOOD PRESSURE: 64 MMHG

## 2021-04-22 DIAGNOSIS — M47.817 LUMBOSACRAL SPONDYLOSIS WITHOUT MYELOPATHY: Primary | ICD-10-CM

## 2021-04-22 DIAGNOSIS — M51.36 DDD (DEGENERATIVE DISC DISEASE), LUMBAR: Primary | ICD-10-CM

## 2021-04-22 DIAGNOSIS — M51.36 DDD (DEGENERATIVE DISC DISEASE), LUMBAR: ICD-10-CM

## 2021-04-22 PROCEDURE — G8420 CALC BMI NORM PARAMETERS: HCPCS | Performed by: ANESTHESIOLOGY

## 2021-04-22 PROCEDURE — 99213 OFFICE O/P EST LOW 20 MIN: CPT | Performed by: ANESTHESIOLOGY

## 2021-04-22 PROCEDURE — 99203 OFFICE O/P NEW LOW 30 MIN: CPT | Performed by: ANESTHESIOLOGY

## 2021-04-22 PROCEDURE — 1036F TOBACCO NON-USER: CPT | Performed by: ANESTHESIOLOGY

## 2021-04-22 PROCEDURE — G8427 DOCREV CUR MEDS BY ELIG CLIN: HCPCS | Performed by: ANESTHESIOLOGY

## 2021-04-22 NOTE — PROGRESS NOTES
Do you currently have any of the following:    Fever: No  Headache:  No  Cough: No  Shortness of breath: No  Exposed to anyone with these symptoms: No         Severiano Eon presents to the [facility] on 4/22/2021. Shauna Blackburn is complaining of pain lower back, legs and hip. The pain is intermittent. The pain is described as aching. Pain is rated on her best day at a 2, on her worst day at a 8, and on average at a 6 on the VAS scale. She took her last dose of Motrin and Relafen yesterday. Any procedures since your last visit: No, with  % relief. Pacemaker or defibrillator: No managed by . She is  on NSAIDS and is not on anticoagulation medications to include none and is managed by . Medication Contract and Consent for Opioid Use Documents Filed      No documents found                /64   Pulse 58   Temp 98 °F (36.7 °C) (Infrared)   Resp 16   Ht 5' 2\" (1.575 m)   Wt 135 lb (61.2 kg)   SpO2 96%   BMI 24.69 kg/m²      No LMP recorded.

## 2021-04-22 NOTE — PROGRESS NOTES
MCG tablet Take 1 tablet by mouth daily 2/1/21  Yes Myrna Valle,    fluconazole (DIFLUCAN) 100 MG tablet TAKE ONE TABLET BY MOUTH EVERY DAY FOR 5 DAYS 1/25/21  Yes Historical Provider, MD   ibuprofen (ADVIL;MOTRIN) 800 MG tablet Take 800 mg by mouth every 6 hours as needed for Pain Last dose 1-2-21   Yes Historical Provider, MD   etonogestrel-ethinyl estradiol (NUVARING) 0.12-0.015 MG/24HR vaginal ring CHANGE EVERY 3 WEEKS 10/3/20  Yes Historical Provider, MD   Multiple Vitamins-Minerals (MULTIVITAMIN ADULT) TABS Take by mouth   Yes Historical Provider, MD   EPINEPHrine (EPIPEN 2-JOSE) 0.3 MG/0.3ML SOAJ injection Inject 0.3 mLs into the muscle once for 1 dose Use as directed for allergic reaction 6/16/20 12/29/20  Myrna Valle DO       Allergies   Allergen Reactions    Bee Venom Anaphylaxis       Social History     Socioeconomic History    Marital status:      Spouse name: Not on file    Number of children: Not on file    Years of education: Not on file    Highest education level: Not on file   Occupational History    Not on file   Social Needs    Financial resource strain: Not on file    Food insecurity     Worry: Not on file     Inability: Not on file    Transportation needs     Medical: Not on file     Non-medical: Not on file   Tobacco Use    Smoking status: Former Smoker    Smokeless tobacco: Never Used   Substance and Sexual Activity    Alcohol use: Not Currently    Drug use: Not Currently    Sexual activity: Not on file   Lifestyle    Physical activity     Days per week: Not on file     Minutes per session: Not on file    Stress: Not on file   Relationships    Social connections     Talks on phone: Not on file     Gets together: Not on file     Attends Evangelical service: Not on file     Active member of club or organization: Not on file     Attends meetings of clubs or organizations: Not on file     Relationship status: Not on file    Intimate partner violence     Fear of current or ex partner: Not on file     Emotionally abused: Not on file     Physically abused: Not on file     Forced sexual activity: Not on file   Other Topics Concern    Not on file   Social History Narrative    Not on file       Family History   Problem Relation Age of Onset    Cancer Father     Hypertension Mother        REVIEW OF SYSTEMS:     Steven denies fever/chills, chest pain, shortness of breath, new bowel or bladder complaints. All other review of systems was negative. PHYSICAL EXAMINATION:      /64   Pulse 58   Temp 98 °F (36.7 °C) (Infrared)   Resp 16   Ht 5' 2\" (1.575 m)   Wt 135 lb (61.2 kg)   SpO2 96%   BMI 24.69 kg/m²   General:        A & O x3     HEENT:     Head:normocephalic and atraumatic     Lungs:     Breathing:Appears normal     Cervical spine:     Inspection:Normal  Range of motion:normal      Thoracic spine:     Range of motion:normal      Lumbar spine:     Range of motion:reduced moderately Lateral bending, flexion, extension rotation bilateral and is painful. Lumbar facet loading + bilaterally.     Extremities:     Tremors:None bilaterally upper and lower     Neurological:     Sensory & Motor: No focal deficits      Dermatology:     Skin:no unusual rashes    Assessment/Plan:   Diagnosis Orders   1. Lumbosacral spondylosis without myelopathy     2. DDD (degenerative disc disease), lumbar       H/o chronic low back pain. Failed conservative treatment     MRI:  Mild DD at L4-5, L5-S1. No significant Neuroforaminal compromise or herniation.     S/P GENA & TFESI with very good pain relief.     Intermittent Low back pain on excessive activities.     PT     Relafen for prn use     Has Zanaflex for prn use muscle spasm.      Plan:  LESI L4-5 under fluoroscopy. RBA discussed and patient agreed.     Consider compound cream.    She is planning to use CBD oil     Counseling reg: HEP and appropriate medication use.     Mike Mcbride MD    CC:  Oswald Mckeon DO

## 2021-05-18 ENCOUNTER — OFFICE VISIT (OUTPATIENT)
Dept: FAMILY MEDICINE CLINIC | Age: 36
End: 2021-05-18
Payer: COMMERCIAL

## 2021-05-18 VITALS
WEIGHT: 144 LBS | BODY MASS INDEX: 25.52 KG/M2 | SYSTOLIC BLOOD PRESSURE: 119 MMHG | DIASTOLIC BLOOD PRESSURE: 83 MMHG | HEIGHT: 63 IN | HEART RATE: 69 BPM | TEMPERATURE: 98.6 F | RESPIRATION RATE: 20 BRPM

## 2021-05-18 DIAGNOSIS — F41.9 ANXIETY: ICD-10-CM

## 2021-05-18 DIAGNOSIS — G89.4 CHRONIC PAIN SYNDROME: ICD-10-CM

## 2021-05-18 DIAGNOSIS — Z13.31 POSITIVE DEPRESSION SCREENING: ICD-10-CM

## 2021-05-18 DIAGNOSIS — F33.0 MILD EPISODE OF RECURRENT MAJOR DEPRESSIVE DISORDER (HCC): ICD-10-CM

## 2021-05-18 DIAGNOSIS — E03.9 ACQUIRED HYPOTHYROIDISM: ICD-10-CM

## 2021-05-18 DIAGNOSIS — M79.18 MYOFASCIAL PAIN SYNDROME: ICD-10-CM

## 2021-05-18 DIAGNOSIS — R94.31 ABNORMAL EKG: ICD-10-CM

## 2021-05-18 DIAGNOSIS — E03.9 ACQUIRED HYPOTHYROIDISM: Primary | ICD-10-CM

## 2021-05-18 LAB
T4 FREE: 1.09 NG/DL (ref 0.93–1.7)
TSH SERPL DL<=0.05 MIU/L-ACNC: 18.03 UIU/ML (ref 0.27–4.2)

## 2021-05-18 PROCEDURE — 93000 ELECTROCARDIOGRAM COMPLETE: CPT | Performed by: FAMILY MEDICINE

## 2021-05-18 PROCEDURE — G8431 POS CLIN DEPRES SCRN F/U DOC: HCPCS | Performed by: FAMILY MEDICINE

## 2021-05-18 PROCEDURE — 99214 OFFICE O/P EST MOD 30 MIN: CPT | Performed by: FAMILY MEDICINE

## 2021-05-18 PROCEDURE — 1036F TOBACCO NON-USER: CPT | Performed by: FAMILY MEDICINE

## 2021-05-18 PROCEDURE — G8427 DOCREV CUR MEDS BY ELIG CLIN: HCPCS | Performed by: FAMILY MEDICINE

## 2021-05-18 PROCEDURE — G8419 CALC BMI OUT NRM PARAM NOF/U: HCPCS | Performed by: FAMILY MEDICINE

## 2021-05-18 RX ORDER — DULOXETIN HYDROCHLORIDE 20 MG/1
20 CAPSULE, DELAYED RELEASE ORAL DAILY
Qty: 30 CAPSULE | Refills: 2 | Status: SHIPPED
Start: 2021-05-18 | End: 2021-07-08

## 2021-05-18 SDOH — ECONOMIC STABILITY: FOOD INSECURITY: WITHIN THE PAST 12 MONTHS, THE FOOD YOU BOUGHT JUST DIDN'T LAST AND YOU DIDN'T HAVE MONEY TO GET MORE.: NEVER TRUE

## 2021-05-18 SDOH — ECONOMIC STABILITY: FOOD INSECURITY: WITHIN THE PAST 12 MONTHS, YOU WORRIED THAT YOUR FOOD WOULD RUN OUT BEFORE YOU GOT MONEY TO BUY MORE.: NEVER TRUE

## 2021-05-18 ASSESSMENT — PATIENT HEALTH QUESTIONNAIRE - PHQ9
9. THOUGHTS THAT YOU WOULD BE BETTER OFF DEAD, OR OF HURTING YOURSELF: 0
1. LITTLE INTEREST OR PLEASURE IN DOING THINGS: 3
SUM OF ALL RESPONSES TO PHQ QUESTIONS 1-9: 21
4. FEELING TIRED OR HAVING LITTLE ENERGY: 3
8. MOVING OR SPEAKING SO SLOWLY THAT OTHER PEOPLE COULD HAVE NOTICED. OR THE OPPOSITE, BEING SO FIGETY OR RESTLESS THAT YOU HAVE BEEN MOVING AROUND A LOT MORE THAN USUAL: 2
5. POOR APPETITE OR OVEREATING: 3
SUM OF ALL RESPONSES TO PHQ QUESTIONS 1-9: 21
6. FEELING BAD ABOUT YOURSELF - OR THAT YOU ARE A FAILURE OR HAVE LET YOURSELF OR YOUR FAMILY DOWN: 1
SUM OF ALL RESPONSES TO PHQ9 QUESTIONS 1 & 2: 6
2. FEELING DOWN, DEPRESSED OR HOPELESS: 3

## 2021-05-18 ASSESSMENT — ENCOUNTER SYMPTOMS
EYE PAIN: 0
SHORTNESS OF BREATH: 0
DIARRHEA: 0
SINUS PRESSURE: 0
ABDOMINAL PAIN: 0
CONSTIPATION: 0
BACK PAIN: 1
COUGH: 0
SORE THROAT: 0

## 2021-05-18 ASSESSMENT — COLUMBIA-SUICIDE SEVERITY RATING SCALE - C-SSRS
1. WITHIN THE PAST MONTH, HAVE YOU WISHED YOU WERE DEAD OR WISHED YOU COULD GO TO SLEEP AND NOT WAKE UP?: NO
2. HAVE YOU ACTUALLY HAD ANY THOUGHTS OF KILLING YOURSELF?: NO
6. HAVE YOU EVER DONE ANYTHING, STARTED TO DO ANYTHING, OR PREPARED TO DO ANYTHING TO END YOUR LIFE?: NO

## 2021-05-18 NOTE — PROGRESS NOTES
Kacy Hicks  : 1985    Chief Complaint:     Chief Complaint   Patient presents with    Establish Care    Depression    Anxiety       HPI  Kacy Hicks 38 y.o. presents for   Chief Complaint   Patient presents with    Establish Care    Depression    Anxiety     Patient presents as a new patient to [facility]. 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  She wishes for an EKG to be repeated here to see if this is true or not. She also has a history of hypothyroidism last TSH was slightly elevated. She is currently taking her medication as prescribed. She also does have chronic pain and myofascial pain syndrome. She does follow with pain management. She also admits to anxiety and depression. She has tried multiple SSRIs in the past.  She has not tried Cymbalta. She denies any suicidal homicidal thoughts. All questions were answered to patients satisfaction.     Past Medical History:   Diagnosis Date    Back pain     GERD (gastroesophageal reflux disease)     Hyperlipidemia     not on any medications    Hypothyroidism     Thyroid disease        Past Surgical History:   Procedure Laterality Date    EYE SURGERY      PAIN MANAGEMENT PROCEDURE Left 2020    LUMBAR EPIDURAL STEROID INJECTION UNDER FLUOROSCOPIC GUIDANCE AT L5-S1 LEFT PARAMEDIAN performed [facility] Amos Dunlap MD at [facility] OR    PAIN MANAGEMENT PROCEDURE Bilateral 2021    LUMBAR TRANSFORAMINAL EPIDURAL STEROID INJECTION RIGHT L4 AND LEFT L5 UNDER FLUORO performed by Amos Dunlap MD at [facility] OR       Social History     Socioeconomic History    Marital status:      Spouse name: None    Number of children: None    Years of education: None    Highest education level: None   Occupational History    None   Tobacco Use    Smoking status: Former Smoker    Smokeless tobacco: Never Used   Vaping Use    Vaping Use: Never used Substance and Sexual Activity    Alcohol use: Not Currently    Drug use: Not Currently    Sexual activity: None   Other Topics Concern    None   Social History Narrative    None     Social Determinants of Health     Financial Resource Strain: Medium Risk    Difficulty of Paying Living Expenses: Somewhat hard   Food Insecurity: No Food Insecurity    Worried About Running Out of Food in the Last Year: Never true    Nataliya of Food in the Last Year: Never true   Transportation Needs:     Lack of Transportation (Medical):  Lack of Transportation (Non-Medical):    Physical Activity:     Days of Exercise per Week:     Minutes of Exercise per Session:    Stress:     Feeling of Stress :    Social Connections:     Frequency of Communication with Friends and Family:     Frequency of Social Gatherings with Friends and Family:     Attends Scientologist Services:     Active Member of Clubs or Organizations:     Attends Club or Organization Meetings:     Marital Status:    Intimate Partner Violence:     Fear of Current or Ex-Partner:     Emotionally Abused:     Physically Abused:     Sexually Abused:        Family History   Problem Relation Age of Onset    Cancer Father     Hypertension Mother           Current Outpatient Medications   Medication Sig Dispense Refill    DULoxetine (CYMBALTA) 20 MG extended release capsule Take 1 capsule by mouth daily 30 capsule 2    levothyroxine (SYNTHROID) 75 MCG tablet Take 1 tablet by mouth daily 30 tablet 2    etonogestrel-ethinyl estradiol (NUVARING) 0.12-0.015 MG/24HR vaginal ring CHANGE EVERY 3 WEEKS      Multiple Vitamins-Minerals (MULTIVITAMIN ADULT) TABS Take by mouth       No current facility-administered medications for this visit.        Allergies   Allergen Reactions    Bee Venom Anaphylaxis       Health Maintenance Due   Topic Date Due    Varicella vaccine (1 of 2 - 2-dose childhood series) Never done    COVID-19 Vaccine (1) Never done   [facility] motion. Cervical back: Normal range of motion and neck supple. Lymphadenopathy:      Cervical: No cervical adenopathy. Skin:     General: Skin is warm and dry. Findings: No rash. Neurological:      Mental Status: She is alert and oriented to person, place, and time. Deep Tendon Reflexes: Reflexes are normal and symmetric. Psychiatric:         Behavior: Behavior normal.              Laboratory: All laboratory and radiology results have been personally reviewed by myself    Lab Results   Component Value Date     02/03/2021    K 4.1 02/03/2021     02/03/2021    CO2 26 02/03/2021    BUN 8 02/03/2021    CREATININE 0.9 02/03/2021    PROT 7.8 01/22/2016    LABALBU 4.4 01/22/2016    CALCIUM 9.3 02/03/2021    GFRAA >60 02/03/2021    LABGLOM >60 02/03/2021    GLUCOSE 87 02/03/2021    AST 21 01/22/2016    ALT 15 01/22/2016    ALKPHOS 55 01/22/2016    BILITOT 0.2 01/22/2016    TSH 17.090 01/14/2021    VITD25 34 02/03/2021    CHOL 151 07/02/2019    TRIG 45 07/02/2019    HDL 48 07/02/2019    LDLCALC 94 07/02/2019    LABA1C 4.8 07/02/2019        Lab Results   Component Value Date    CHOL 151 07/02/2019    CHOL 131 01/22/2016    CHOL 159 02/03/2015     Lab Results   Component Value Date    TRIG 45 07/02/2019    TRIG 61 01/22/2016    TRIG 84 02/03/2015     Lab Results   Component Value Date    HDL 48 07/02/2019    HDL 44 01/22/2016    HDL 43 02/03/2015     Lab Results   Component Value Date    LDLCALC 94 07/02/2019    LDLCALC 75 01/22/2016    LDLCALC 99 02/03/2015       Lab Results   Component Value Date    LABA1C 4.8 07/02/2019    LABA1C 5.3 01/22/2016    LABA1C 5.4 02/03/2015     Lab Results   Component Value Date    LDLCALC 94 07/02/2019    CREATININE 0.9 02/03/2021       ASSESSMENT/PLAN:     Diagnosis Orders   1. Acquired hypothyroidism  TSH without Reflex    T4, FREE    Emi Cisneros MD, Endocrinology, [place]   2. Abnormal EKG  EKG 12 Lead    EKG 12 Lead   3.  Myofascial pain syndrome     4. Chronic pain syndrome     5. Mild episode of recurrent major depressive disorder ([facility] [address]     6. Anxiety     7. Positive depression screening  Positive Screen for Clinical Depression with a Documented Follow-up Plan      We will recheck TSH and patient does wish for referral to endocrinology which was placed today. EKG reviewed with patient today and within normal limits if any pain or shortness of breath occur she will call for reevaluation. Continue to follow with pain management as directed. As for mood will start Cymbalta. Side effects medication reviewed with patient. Patient will return in 6 weeks for reevaluation    Problem list reviewed andsimplified/updated  HM reviewed today and counseled as appropriate    Call or go to ED immediately if symptoms worsen or persist.  Future Appointments   Date Time Provider Magdi Calderon   5/27/2021  3:15 PM Aggie Trejo MD [facility] PAIN [facility]   7/8/2021 10:45 AM DO Sophie Yoo [facility]     Or sooner if necessary. Educational materials and/or homeexercises printed for patient's review and were included in patient instructions on his/her After Visit Summary and given to patient at the end of visit.       Counseled regarding above diagnosis, including possible risks and complications,  especially if left uncontrolled.     Counseled regarding the possible side effects, risks, benefits and alternatives to treatment; patient and/or guardian verbalizes understanding, agrees,feels comfortable with and wishes to proceed with above treatment plan.     Advised patient to call Erik Morillo new medication issues, and read all Rx info from pharmacy to assure aware of all possible risks and side effects of medication before taking.     Reviewed age and gender appropriate health screening exams and vaccinations.   Advised patient regarding importance of keeping up with recommended health maintenance and toschedule as soon as possible if overdue, as this is important in assessing for undiagnosed pathology, especially cancer, as well as protecting against potentially harmful/life threatening disease.          Patient and/or guardian verbalizes understanding and agrees with above counseling, assessment and plan.     All questions answered. Ellie Hansen, DO  5/18/21    NOTE: This report was transcribed using voice recognition software. Every effort was made to ensure accuracy; however, inadvertent computerized transcription errors may be present    On the basis of positive PHQ-9 screening (PHQ-9 Total Score: 21), the following plan was implemented: medication prescribed - patient will call for any significant medication side effects or worsening symptoms of depression. Patient will follow-up in 6 week(s) with PCP.

## 2021-06-02 ENCOUNTER — OFFICE VISIT (OUTPATIENT)
Dept: PAIN MANAGEMENT | Age: 36
End: 2021-06-02
Payer: COMMERCIAL

## 2021-06-02 VITALS
OXYGEN SATURATION: 100 % | HEART RATE: 91 BPM | DIASTOLIC BLOOD PRESSURE: 78 MMHG | HEIGHT: 63 IN | RESPIRATION RATE: 16 BRPM | BODY MASS INDEX: 25.52 KG/M2 | SYSTOLIC BLOOD PRESSURE: 118 MMHG | WEIGHT: 144 LBS | TEMPERATURE: 97.8 F

## 2021-06-02 DIAGNOSIS — M54.16 LUMBAR RADICULOPATHY: ICD-10-CM

## 2021-06-02 DIAGNOSIS — M47.817 LUMBOSACRAL SPONDYLOSIS WITHOUT MYELOPATHY: ICD-10-CM

## 2021-06-02 DIAGNOSIS — M51.36 DDD (DEGENERATIVE DISC DISEASE), LUMBAR: Primary | ICD-10-CM

## 2021-06-02 PROCEDURE — 1036F TOBACCO NON-USER: CPT | Performed by: ANESTHESIOLOGY

## 2021-06-02 PROCEDURE — G8419 CALC BMI OUT NRM PARAM NOF/U: HCPCS | Performed by: ANESTHESIOLOGY

## 2021-06-02 PROCEDURE — 99203 OFFICE O/P NEW LOW 30 MIN: CPT | Performed by: ANESTHESIOLOGY

## 2021-06-02 PROCEDURE — G8427 DOCREV CUR MEDS BY ELIG CLIN: HCPCS | Performed by: ANESTHESIOLOGY

## 2021-06-02 PROCEDURE — 99214 OFFICE O/P EST MOD 30 MIN: CPT | Performed by: ANESTHESIOLOGY

## 2021-06-02 RX ORDER — HYDROCODONE BITARTRATE AND ACETAMINOPHEN 5; 325 MG/1; MG/1
1 TABLET ORAL 2 TIMES DAILY PRN
Qty: 14 TABLET | Refills: 0 | Status: SHIPPED | OUTPATIENT
Start: 2021-06-02 | End: 2021-06-09

## 2021-06-02 RX ORDER — METHYLPREDNISOLONE 4 MG/1
TABLET ORAL
Qty: 1 KIT | Refills: 0 | Status: SHIPPED | OUTPATIENT
Start: 2021-06-02 | End: 2021-06-08

## 2021-06-02 NOTE — LETTER
[address] [place] [address]  Eduardo Reardon [id number]  Phone: [phone]  Fax: [phone]    Catalina Grant MD        June 2, 2021     Patient: Maira Loyd   YOB: 1985   Date of Visit: 6/2/2021       To Whom it May Concern:    Senia Karimi was seen in my clinic on 6/2/2021. Please excuse Manojnathalie Coombs from school on 6/3/2021. If you have any questions or concerns, please don't hesitate to call.     Sincerely,         Catalina Grant MD

## 2021-06-02 NOTE — PROGRESS NOTES
Do you currently have any of the following:    Fever: No  Headache:  No  Cough: No  Shortness of breath: No  Exposed to anyone with these symptoms: No         Pa Blevins presents to the [address] [place] [address] on 6/2/2021. Zackary Bear is complaining of pain left hip. The pain is constant. The pain is described as aching and shooting. Pain is rated on her best day at a 2, on her worst day at a 9, and on average at a 3 on the VAS scale. She took her last dose of Motrin . Any procedures since your last visit: No, with  % relief. Pacemaker or defibrillator: No managed by . She is not on NSAIDS and is not on anticoagulation medications to include none and is managed by . Medication Contract and Consent for Opioid Use Documents Filed      No documents found                /78   Pulse 91   Temp 97.8 °F (36.6 °C) (Infrared)   Resp 16   Ht 5' 3\" (1.6 m)   Wt 144 lb (65.3 kg)   SpO2 100%   BMI 25.51 kg/m²      No LMP recorded.

## 2021-06-02 NOTE — PROGRESS NOTES
[facility] Pain Management  [address], [address] [facility]  Dept: [phone]    Follow up Note      Trent Michel     Date of Visit:  6/2/2021    CC:  Patient presents for follow up   Chief Complaint   Patient presents with    Follow-up    Hip Pain     left hip       HPI:  Low back pain.     S/P LESI on 11/23/2020 with > 90% relief for few weeks.     S/P TFESI on 1/7/2021- helped > 50 %     Continues low back pain on excessive activities.     She apparently lifted a heavy table during the memorial weekend and noticed increased low back pain and left LE pain. Pain causes significant limitations.     Continues to do HEP as tolerated.     Nursing notes and details of the pain history reviewed. Please see intake notes for details.     Previous treatments:   Physical Therapy : yes, and continues HEP  for the past few months.     Chiropractic treatment: yes     Medications: - NSAID's : yes                        - Membrane stabilizers : no                       - Opioids : no                       - Adjuvants or Others : yes     TENS Unit: no     Surgeries: no LS spine surgery     She has not been on anticoagulation medications       She has not been on herbal supplements.       She is not diabetic.      Imaging:      Mri of LS spine: 7/13/2020:      FINDINGS:    BONES/ALIGNMENT: There is normal alignment of the lumbar spine.  The    vertebral body heights are maintained.  There is no fracture or destructive    osseous lesion.  There is degenerative disc disease with disc desiccation and    endplate changes, most pronounced at L4-5.  The intervertebral disc heights    are grossly maintained.         SPINAL CORD: The conus terminates normally.         SOFT TISSUES: No paraspinal mass identified.         L1-L2: There is no significant disc herniation, spinal canal stenosis or    neural foraminal narrowing.         L2-L3: There is no significant disc herniation, spinal canal stenosis or    neural foraminal narrowing.      L3-L4: There is no significant disc herniation, spinal canal stenosis or    neural foraminal narrowing.         L4-L5: There is disc bulge, superimposed central disc protrusion, with mild    right foraminal narrowing.  No spinal canal narrowing.         L5-S1: There is disc bulge, annular fissure with minimal right foraminal    narrowing.  No spinal canal narrowing.              Impression    Degenerative disc disease as described above, without spinal canal narrowing.         Foraminal narrowing, mild at right L4-5, minimal at right L5-S1.       Xray LS spine: 6/17/2020:    RESULT: 3 views of the lumbar spine were obtained. Beni Minus are 5 lumbar   vertebral bodies which are of normal alignment.  I see no fracture or   destructive process.  There is mild degenerative changes involving L4-S1   level with marked disc space narrowing. IMPRESSION: MILD DEGENERATIVE CHANGES MARKED DISC SPACE NARROWING LOWER   LUMBAR SPINE                                        Potential Aberrant Drug-Related Behavior: no      Urine Drug Screening: no     OARRS report: Reviewed   12/28/20 1/27/2021: not on chronic opioids. 4/22/2021: not on chronic opioids. 6/2/2021: Not on chronic opioids. Past Medical History:   Diagnosis Date    Back pain     GERD (gastroesophageal reflux disease)     Hyperlipidemia     not on any medications    Hypothyroidism     Thyroid disease        Past Surgical History:   Procedure Laterality Date    EYE SURGERY      PAIN MANAGEMENT PROCEDURE Left 11/23/2020    LUMBAR EPIDURAL STEROID INJECTION UNDER FLUOROSCOPIC GUIDANCE AT L5-S1 LEFT PARAMEDIAN performed by Zoe John MD at [facility] OR    PAIN MANAGEMENT PROCEDURE Bilateral 1/7/2021    LUMBAR TRANSFORAMINAL EPIDURAL STEROID INJECTION RIGHT L4 AND LEFT L5 UNDER FLUORO performed by Zeo John MD at [address] [facility]       Prior to Admission medications    Medication Sig Start Date End Date Taking?  Authorizing Provider   DULoxetine (CYMBALTA) 20 MG extended release capsule Take 1 capsule by mouth daily 5/18/21   Kole Zafar,    levothyroxine (SYNTHROID) 75 MCG tablet Take 1 tablet by mouth daily 2/1/21   Jermaine Ward DO   etonogestrel-ethinyl estradiol (NUVARING) 0.12-0.015 MG/24HR vaginal ring CHANGE EVERY 3 WEEKS 10/3/20   Historical Provider, MD   Multiple Vitamins-Minerals (MULTIVITAMIN ADULT) TABS Take by mouth    Historical Provider, MD       Allergies   Allergen Reactions    Bee Venom Anaphylaxis       Social History     Socioeconomic History    Marital status:      Spouse name: Not on file    Number of children: Not on file    Years of education: Not on file    Highest education level: Not on file   Occupational History    Not on file   Tobacco Use    Smoking status: Former Smoker    Smokeless tobacco: Never Used   Vaping Use    Vaping Use: Never used   Substance and Sexual Activity    Alcohol use: Not Currently    Drug use: Not Currently    Sexual activity: Not on file   Other Topics Concern    Not on file   Social History Narrative    Not on file     Social Determinants of Health     Financial Resource Strain: Medium Risk    Difficulty of Paying Living Expenses: Somewhat hard   Food Insecurity: No Food Insecurity    Worried About Running Out of Food in the Last Year: Never true    Nataliya of Food in the Last Year: Never true   Transportation Needs:     Lack of Transportation (Medical):      Lack of Transportation (Non-Medical):    Physical Activity:     Days of Exercise per Week:     Minutes of Exercise per Session:    Stress:     Feeling of Stress :    Social Connections:     Frequency of Communication with Friends and Family:     Frequency of Social Gatherings with Friends and Family:     Attends Congregation Services:     Active Member of Clubs or Organizations:     Attends Club or Organization Meetings:     Marital Status:    Intimate Partner Violence:     Fear of Current or Ex-Partner:     Emotionally Abused:     Physically Abused:     Sexually Abused:        Family History   Problem Relation Age of Onset    Cancer Father     Hypertension Mother        REVIEW OF SYSTEMS:     Shiloh Degroot denies fever/chills, chest pain, shortness of breath, new bowel or bladder complaints. All other review of systems was negative. PHYSICAL EXAMINATION:      /78   Pulse 91   Temp 97.8 °F (36.6 °C) (Infrared)   Resp 16   Ht 5' 3\" (1.6 m)   Wt 144 lb (65.3 kg)   SpO2 100%   BMI 25.51 kg/m²   General:        A & O x3     HEENT:     Head:normocephalic and atraumatic     Lungs:     Breathing:Appears normal     Cervical spine:     Inspection:Normal  Range of motion:normal      Thoracic spine:     Range of motion:normal      Lumbar spine:     Range of motion:reduced moderately Lateral bending, flexion, extension rotation bilateral and is painful. Lumbar facet tenderness +     Extremities:     Tremors:None bilaterally upper and lower     Neurological:     Sensory & Motor: No focal deficits      Dermatology:     Skin:no unusual rashes    Assessment/Plan:   Diagnosis Orders   1. DDD (degenerative disc disease), lumbar     2. Lumbosacral spondylosis without myelopathy     3. Lumbar radiculopathy       H/o chronic low back pain. Failed conservative treatment.     MRI:  Mild DD at L4-5, L5-S1. No significant Neuroforaminal compromise or herniation.     S/P GENA & TFESI with very good pain relief. Recent exacerbation of pain after lifting a heavy table. Has been started on Cymbalta- for anxiety- helping. PT/ HEP. Will give Medrol dose pack. Use instructions reviewed. Short course of Norco for prn use. # 14 tabs.     Relafen for prn use     Zanaflex for prn use muscle spasm.     If pain persists, will plan LESI L5-S1 left paramedian under fluoroscopy. RBA discussed and patient agreed. She can call for the procedure if pain persists. She is planning to use CBD oil.     F/u in 2-3 months or sooner if needed. She needs a letter to be off work for one day - will give      Counseling reg: HEP and appropriate medication use.     Anjelica Sol MD    CC:  Sameer Melendez DO

## 2021-06-08 ENCOUNTER — PATIENT MESSAGE (OUTPATIENT)
Dept: FAMILY MEDICINE CLINIC | Age: 36
End: 2021-06-08

## 2021-06-08 DIAGNOSIS — E03.9 ACQUIRED HYPOTHYROIDISM: ICD-10-CM

## 2021-06-09 RX ORDER — LEVOTHYROXINE SODIUM 88 UG/1
88 TABLET ORAL DAILY
Qty: 30 TABLET | Refills: 2 | Status: SHIPPED
Start: 2021-06-09 | End: 2021-06-10 | Stop reason: SDUPTHER

## 2021-06-09 NOTE — TELEPHONE ENCOUNTER
From: Raheem Mayer  To: Ellie 5, DO  Sent: 6/8/2021 10:22 PM EDT  Subject: Non-Urgent Medical Question    [facility], I just now saw messaged me about the thyroid levels. I try to take it on an empty stomach, im not much of an eater in the AM so most of the time I take it with coffee. Im out of thyroid meds btw. I meant to email you this morning, took the last one yesterday.

## 2021-06-10 RX ORDER — LEVOTHYROXINE SODIUM 88 UG/1
88 TABLET ORAL DAILY
Qty: 30 TABLET | Refills: 2 | Status: SHIPPED
Start: 2021-06-10 | End: 2021-10-01

## 2021-06-14 ENCOUNTER — TELEPHONE (OUTPATIENT)
Dept: FAMILY MEDICINE CLINIC | Age: 36
End: 2021-06-14

## 2021-06-14 NOTE — TELEPHONE ENCOUNTER
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  Please advise

## 2021-06-15 NOTE — TELEPHONE ENCOUNTER
I understand her concerns if they are using precautions such as alcohol and new needles everytime then risk is very low

## 2021-06-15 NOTE — TELEPHONE ENCOUNTER
Spoke to patient yesterday, they do use alcohol to cleanse skin. Pt is more concerned that they are using the same draw site multiple times in a night. She said that they use the same AC up to 9 times in once night.

## 2021-07-08 ENCOUNTER — OFFICE VISIT (OUTPATIENT)
Dept: FAMILY MEDICINE CLINIC | Age: 36
End: 2021-07-08
Payer: COMMERCIAL

## 2021-07-08 VITALS
HEART RATE: 72 BPM | DIASTOLIC BLOOD PRESSURE: 72 MMHG | SYSTOLIC BLOOD PRESSURE: 113 MMHG | BODY MASS INDEX: 25.21 KG/M2 | OXYGEN SATURATION: 100 % | RESPIRATION RATE: 20 BRPM | HEIGHT: 62 IN | TEMPERATURE: 97.1 F | WEIGHT: 137 LBS

## 2021-07-08 DIAGNOSIS — F33.0 MILD EPISODE OF RECURRENT MAJOR DEPRESSIVE DISORDER (HCC): ICD-10-CM

## 2021-07-08 DIAGNOSIS — E03.9 ACQUIRED HYPOTHYROIDISM: Primary | ICD-10-CM

## 2021-07-08 PROCEDURE — G8419 CALC BMI OUT NRM PARAM NOF/U: HCPCS | Performed by: FAMILY MEDICINE

## 2021-07-08 PROCEDURE — G8427 DOCREV CUR MEDS BY ELIG CLIN: HCPCS | Performed by: FAMILY MEDICINE

## 2021-07-08 PROCEDURE — 99213 OFFICE O/P EST LOW 20 MIN: CPT | Performed by: FAMILY MEDICINE

## 2021-07-08 PROCEDURE — 1036F TOBACCO NON-USER: CPT | Performed by: FAMILY MEDICINE

## 2021-07-08 RX ORDER — DULOXETINE 40 MG/1
40 CAPSULE, DELAYED RELEASE ORAL DAILY
Qty: 30 CAPSULE | Refills: 2 | Status: SHIPPED
Start: 2021-07-08 | End: 2021-07-13 | Stop reason: ALTCHOICE

## 2021-07-08 NOTE — PROGRESS NOTES
Jessie Almanzar  : 1985    Chief Complaint:     Chief Complaint   Patient presents with    Hypothyroidism       HPI  Jessie Almanzar 38 y.o. presents for   Chief Complaint   Patient presents with    Hypothyroidism     Patient presents for follow-up today. She was started on Cymbalta which she does feel is helping her. She is on 20 mg. However she is noticing vivid dreams. Her mood is improved. All questions were answered to patients satisfaction.     Past Medical History:   Diagnosis Date    Back pain     GERD (gastroesophageal reflux disease)     Hyperlipidemia     not on any medications    Hypothyroidism     Thyroid disease        Past Surgical History:   Procedure Laterality Date    EYE SURGERY      PAIN MANAGEMENT PROCEDURE Left 2020    LUMBAR EPIDURAL STEROID INJECTION UNDER FLUOROSCOPIC GUIDANCE AT L5-S1 LEFT PARAMEDIAN performed by Mayur Hernandez MD at [facility] OR    PAIN MANAGEMENT PROCEDURE Bilateral 2021    LUMBAR TRANSFORAMINAL EPIDURAL STEROID INJECTION RIGHT L4 AND LEFT L5 UNDER FLUORO performed by Mayur Hernandez MD at [facility] OR       Social History     Socioeconomic History    Marital status:      Spouse name: None    Number of children: None    Years of education: None    Highest education level: None   Occupational History    None   Tobacco Use    Smoking status: Former Smoker    Smokeless tobacco: Never Used   Vaping Use    Vaping Use: Never used   Substance and Sexual Activity    Alcohol use: Not Currently    Drug use: Not Currently    Sexual activity: None   Other Topics Concern    None   Social History Narrative    None     Social Determinants of Health     Financial Resource Strain: Medium Risk    Difficulty of Paying Living Expenses: Somewhat hard   Food Insecurity: No Food Insecurity    Worried About Running Out of Food in the Last Year: Never true    Nataliya of Food in the Last Year: Never true   Transportation Needs:     Lack of Transportation (Medical):  Lack of Transportation (Non-Medical):    Physical Activity:     Days of Exercise per Week:     Minutes of Exercise per Session:    Stress:     Feeling of Stress :    Social Connections:     Frequency of Communication with Friends and Family:     Frequency of Social Gatherings with Friends and Family:     Attends Restorationism Services:     Active Member of Clubs or Organizations:     Attends Club or Organization Meetings:     Marital Status:    Intimate Partner Violence:     Fear of Current or Ex-Partner:     Emotionally Abused:     Physically Abused:     Sexually Abused:        Family History   Problem Relation Age of Onset    Cancer Father     Hypertension Mother           Current Outpatient Medications   Medication Sig Dispense Refill    DULoxetine 40 MG CPEP Take 40 mg by mouth daily 30 capsule 2    levothyroxine (SYNTHROID) 88 MCG tablet Take 1 tablet by mouth daily 30 tablet 2    etonogestrel-ethinyl estradiol (NUVARING) 0.12-0.015 MG/24HR vaginal ring CHANGE EVERY 3 WEEKS      Multiple Vitamins-Minerals (MULTIVITAMIN ADULT) TABS Take by mouth       No current facility-administered medications for this visit. Allergies   Allergen Reactions    Bee Venom Anaphylaxis       Health Maintenance Due   Topic Date Due    Varicella vaccine (1 of 2 - 2-dose childhood series) Never done    COVID-19 Vaccine (1) Never done    DTaP/Tdap/Td vaccine (1 - Tdap) Never done    Cervical cancer screen  Never done           REVIEW OF SYSTEMS  Review of Systems   Constitutional: Negative for fatigue and fever. HENT: Negative for ear pain, sinus pressure, sneezing and sore throat. Eyes: Negative for pain. Respiratory: Negative for cough and shortness of breath. Cardiovascular: Negative for chest pain and leg swelling. Gastrointestinal: Negative for abdominal pain, constipation and diarrhea. Genitourinary: Negative for dysuria and urgency.    Musculoskeletal: Positive for arthralgias and back pain. Negative for myalgias. Skin: Negative for rash. Allergic/Immunologic: Negative for food allergies. Neurological: Negative for light-headedness and headaches. Hematological: Does not bruise/bleed easily. Psychiatric/Behavioral: Negative for behavioral problems, dysphoric mood and sleep disturbance. The patient is nervous/anxious. PHYSICAL EXAM  /72 (Site: Left Upper Arm, Position: Sitting, Cuff Size: Medium Adult)   Pulse 72   Temp 97.1 °F (36.2 °C) (Temporal)   Resp 20   Ht 5' 2\" (1.575 m)   Wt 137 lb (62.1 kg)   SpO2 100%   BMI 25.06 kg/m²   Physical Exam  Vitals and nursing note reviewed. Constitutional:       Appearance: She is well-developed. HENT:      Head: Normocephalic and atraumatic. Right Ear: External ear normal.      Left Ear: External ear normal.      Nose: Nose normal.   Eyes:      Conjunctiva/sclera: Conjunctivae normal.   Neck:      Thyroid: No thyromegaly. Cardiovascular:      Rate and Rhythm: Normal rate and regular rhythm. Pulmonary:      Effort: Pulmonary effort is normal.      Breath sounds: Normal breath sounds. Abdominal:      Palpations: Abdomen is soft. Tenderness: There is no abdominal tenderness. Musculoskeletal:         General: No tenderness. Normal range of motion. Cervical back: Normal range of motion and neck supple. Lymphadenopathy:      Cervical: No cervical adenopathy. Skin:     General: Skin is warm and dry. Findings: No rash. Neurological:      Mental Status: She is alert and oriented to person, place, and time. Deep Tendon Reflexes: Reflexes are normal and symmetric. Psychiatric:         Behavior: Behavior normal.              Laboratory:   All laboratory and radiology results have been personally reviewed by myself    Lab Results   Component Value Date     02/03/2021    K 4.1 02/03/2021     02/03/2021    CO2 26 02/03/2021    BUN 8 02/03/2021 CREATININE 0.9 02/03/2021    PROT 7.8 01/22/2016    LABALBU 4.4 01/22/2016    CALCIUM 9.3 02/03/2021    GFRAA >60 02/03/2021    LABGLOM >60 02/03/2021    GLUCOSE 87 02/03/2021    AST 21 01/22/2016    ALT 15 01/22/2016    ALKPHOS 55 01/22/2016    BILITOT 0.2 01/22/2016    TSH 18.030 05/18/2021    VITD25 34 02/03/2021    CHOL 151 07/02/2019    TRIG 45 07/02/2019    HDL 48 07/02/2019    LDLCALC 94 07/02/2019    LABA1C 4.8 07/02/2019        Lab Results   Component Value Date    CHOL 151 07/02/2019    CHOL 131 01/22/2016    CHOL 159 02/03/2015     Lab Results   Component Value Date    TRIG 45 07/02/2019    TRIG 61 01/22/2016    TRIG 84 02/03/2015     Lab Results   Component Value Date    HDL 48 07/02/2019    HDL 44 01/22/2016    HDL 43 02/03/2015     Lab Results   Component Value Date    LDLCALC 94 07/02/2019    LDLCALC 75 01/22/2016    LDLCALC 99 02/03/2015       Lab Results   Component Value Date    LABA1C 4.8 07/02/2019    LABA1C 5.3 01/22/2016    LABA1C 5.4 02/03/2015     Lab Results   Component Value Date    LDLCALC 94 07/02/2019    CREATININE 0.9 02/03/2021       ASSESSMENT/PLAN:     Diagnosis Orders   1. Acquired hypothyroidism     2. Mild episode of recurrent major depressive disorder (HCC)       Increase cymbalta to 40 mg. If vivid dreams continue then would recommend to stop and consider another medication. Patient is agreeable. Problem list reviewed andsimplified/updated  HM reviewed today and counseled as appropriate    Call or go to ED immediately if symptoms worsen or persist.  Future Appointments   Date Time Provider Magdi Calderon   11/22/2021  2:00 PM Candido Schneider MD [facility]     Or sooner if necessary.       Educational materials and/or homeexercises printed for patient's review and were included in patient instructions on his/her After Visit Summary and given to patient at the end of visit.       Counseled regarding above diagnosis, including possible risks and complications, especially if left uncontrolled.     Counseled regarding the possible side effects, risks, benefits and alternatives to treatment; patient and/or guardian verbalizes understanding, agrees,feels comfortable with and wishes to proceed with above treatment plan.     Advised patient to call [facility] new medication issues, and read all Rx info from pharmacy to assure aware of all possible risks and side effects of medication before taking.     Reviewed age and gender appropriate health screening exams and vaccinations. Advised patient regarding importance of keeping up with recommended health maintenance and toschedule as soon as possible if overdue, as this is important in assessing for undiagnosed pathology, especially cancer, as well as protecting against potentially harmful/life threatening disease.          Patient and/or guardian verbalizes understanding and agrees with above counseling, assessment and plan.     All questions answered. Ellie 5, DO  7/8/21    NOTE: This report was transcribed using voice recognition software.  Every effort was made to ensure accuracy; however, inadvertent computerized transcription errors may be present

## 2021-07-09 ASSESSMENT — ENCOUNTER SYMPTOMS
SINUS PRESSURE: 0
EYE PAIN: 0
CONSTIPATION: 0
SORE THROAT: 0
ABDOMINAL PAIN: 0
DIARRHEA: 0
COUGH: 0
BACK PAIN: 1
SHORTNESS OF BREATH: 0

## 2021-07-13 ENCOUNTER — TELEPHONE (OUTPATIENT)
Dept: FAMILY MEDICINE CLINIC | Age: 36
End: 2021-07-13

## 2021-07-13 RX ORDER — DULOXETIN HYDROCHLORIDE 30 MG/1
30 CAPSULE, DELAYED RELEASE ORAL DAILY
Qty: 30 CAPSULE | Refills: 5 | Status: SHIPPED
Start: 2021-07-13 | End: 2021-12-10 | Stop reason: DRUGHIGH

## 2021-07-13 NOTE — TELEPHONE ENCOUNTER
Pt returned call , message given , she is willing to try the 30 mg , let her know she cn  at the pharmacy

## 2021-07-21 ENCOUNTER — OFFICE VISIT (OUTPATIENT)
Dept: PAIN MANAGEMENT | Age: 36
End: 2021-07-21
Payer: COMMERCIAL

## 2021-07-21 VITALS
OXYGEN SATURATION: 99 % | WEIGHT: 137 LBS | DIASTOLIC BLOOD PRESSURE: 64 MMHG | HEIGHT: 62 IN | BODY MASS INDEX: 25.21 KG/M2 | SYSTOLIC BLOOD PRESSURE: 110 MMHG | HEART RATE: 85 BPM | TEMPERATURE: 97.8 F | RESPIRATION RATE: 16 BRPM

## 2021-07-21 DIAGNOSIS — M51.36 DDD (DEGENERATIVE DISC DISEASE), LUMBAR: ICD-10-CM

## 2021-07-21 DIAGNOSIS — M79.18 MYOFASCIAL PAIN SYNDROME: ICD-10-CM

## 2021-07-21 DIAGNOSIS — M47.816 LUMBAR FACET ARTHROPATHY: ICD-10-CM

## 2021-07-21 DIAGNOSIS — M53.3 SACROILIAC DYSFUNCTION: ICD-10-CM

## 2021-07-21 DIAGNOSIS — G89.4 CHRONIC PAIN SYNDROME: ICD-10-CM

## 2021-07-21 DIAGNOSIS — M47.817 LUMBOSACRAL SPONDYLOSIS WITHOUT MYELOPATHY: Primary | ICD-10-CM

## 2021-07-21 DIAGNOSIS — M47.816 LUMBAR SPONDYLOSIS: ICD-10-CM

## 2021-07-21 DIAGNOSIS — M54.16 LUMBAR RADICULOPATHY: ICD-10-CM

## 2021-07-21 PROCEDURE — G8427 DOCREV CUR MEDS BY ELIG CLIN: HCPCS | Performed by: PHYSICIAN ASSISTANT

## 2021-07-21 PROCEDURE — 99213 OFFICE O/P EST LOW 20 MIN: CPT | Performed by: PHYSICIAN ASSISTANT

## 2021-07-21 PROCEDURE — 1036F TOBACCO NON-USER: CPT | Performed by: PHYSICIAN ASSISTANT

## 2021-07-21 PROCEDURE — G8419 CALC BMI OUT NRM PARAM NOF/U: HCPCS | Performed by: PHYSICIAN ASSISTANT

## 2021-07-21 RX ORDER — GABAPENTIN 100 MG/1
100 CAPSULE ORAL 3 TIMES DAILY
Qty: 90 CAPSULE | Refills: 0 | Status: SHIPPED
Start: 2021-07-21 | End: 2022-05-05

## 2021-07-21 NOTE — PROGRESS NOTES
Do you currently have any of the following:    Fever: No  Headache:  No  Cough: No  Shortness of breath: No  Exposed to anyone with these symptoms: Ashlyn         Cheikh Medeiros presents to the [address] [place] [address] on 7/21/2021. Hal Litten is complaining of pain bilateral hip pain into both legs to the knees. The pain is constant. The pain is described as aching and sharp. Pain is rated on her best day at a 3, on her worst day at a 10, and on average at a 3 on the VAS scale. She took her last dose of norco .     Any procedures since your last visit: No, with  % relief. Pacemaker or defibrillator: No managed by . She is  on NSAIDS and is not on anticoagulation medications to include none and is managed by . Medication Contract and Consent for Opioid Use Documents Filed      No documents found                /64   Pulse 85   Temp 97.8 °F (36.6 °C) (Infrared)   Resp 16   Ht 5' 2\" (1.575 m)   Wt 137 lb (62.1 kg)   SpO2 99%   BMI 25.06 kg/m²      No LMP recorded.

## 2021-07-21 NOTE — PROGRESS NOTES
[facility]  [address]  [facility]    Follow up Note      Maci Beck     Date of Visit:  7/21/2021    CC:  Patient presents for follow up   Chief Complaint   Patient presents with    Follow-up    Hip Pain     bilateral hip pain that goes into both legs to the knees       HPI:    Pain is worse to lower back and legs. Reports that she would like to stay away from medication and injections if possible. Feels better when standing. Appropriate analgesia with current medications regimen: Not applicable. Change in quality of symptoms:no. Medication side effects: None. Recent diagnostic testing:none. Recent interventional procedures:none.     Previous treatments:   Physical Therapy : yes   Chiropractic treatment: yes     Medications: - NSAID's : yes                        - Membrane stabilizers : no                       - Opioids : no                       - Adjuvants or Others : yes     TENS Unit: no     Surgeries: no LS spine surgery     She has not been on anticoagulation medications       She has not been on herbal supplements.       She is not diabetic.      Imaging:      Mri of LS spine: 7/13/2020:      FINDINGS:    BONES/ALIGNMENT: There is normal alignment of the lumbar spine.  The    vertebral body heights are maintained.  There is no fracture or destructive    osseous lesion.  There is degenerative disc disease with disc desiccation and    endplate changes, most pronounced at L4-5.  The intervertebral disc heights    are grossly maintained.         SPINAL CORD: The conus terminates normally.         SOFT TISSUES: No paraspinal mass identified.         L1-L2: There is no significant disc herniation, spinal canal stenosis or    neural foraminal narrowing.         L2-L3: There is no significant disc herniation, spinal canal stenosis or    neural foraminal narrowing.         L3-L4: There is no significant disc herniation, spinal canal stenosis or    neural foraminal narrowing.         L4-L5: There is disc bulge, superimposed central disc protrusion, with mild    right foraminal narrowing.  No spinal canal narrowing.         L5-S1: There is disc bulge, annular fissure with minimal right foraminal    narrowing.  No spinal canal narrowing.              Impression    Degenerative disc disease as described above, without spinal canal narrowing.         Foraminal narrowing, mild at right L4-5, minimal at right L5-S1.       Xray LS spine: 6/17/2020:    RESULT: 3 views of the lumbar spine were obtained. Meagan An are 5 lumbar   vertebral bodies which are of normal alignment.  I see no fracture or   destructive process.  There is mild degenerative changes involving L4-S1   level with marked disc space narrowing. IMPRESSION: MILD DEGENERATIVE CHANGES MARKED DISC SPACE NARROWING LOWER   LUMBAR SPINE                                        Potential Aberrant Drug-Related Behavior: no      Urine Drug Screening: no     OARRS report: Reviewed   12/28/20 1/27/2021: not on chronic opioids. 4/22/2021: not on chronic opioids. 6/2/2021: Not on chronic opioids. 07/21/2021:  Not on chronic opioids     Opioid Agreement:  Date enacted: Only short course given. Renewal date:    Past Medical History:   Diagnosis Date    Back pain     GERD (gastroesophageal reflux disease)     Hyperlipidemia     not on any medications    Hypothyroidism     Thyroid disease        Past Surgical History:   Procedure Laterality Date    EYE SURGERY      PAIN MANAGEMENT PROCEDURE Left 11/23/2020    LUMBAR EPIDURAL STEROID INJECTION UNDER FLUOROSCOPIC GUIDANCE AT L5-S1 LEFT PARAMEDIAN performed by Ad Morris [facility] [place]    PAIN MANAGEMENT PROCEDURE Bilateral 1/7/2021    LUMBAR TRANSFORAMINAL EPIDURAL STEROID INJECTION RIGHT L4 AND LEFT L5 UNDER FLUORO performed by Ad Morris MD at [facility] OR       Prior to Admission medications    Medication Sig Start Date End Date Taking?  Authorizing Provider gabapentin (NEURONTIN) 100 MG capsule Take 1 capsule by mouth 3 times daily for 30 days. 7/21/21 8/20/21 Yes ANDRES Stephens   DULoxetine (CYMBALTA) 30 MG extended release capsule Take 1 capsule by mouth daily 7/13/21  Yes Meseret Comas, DO   levothyroxine (SYNTHROID) 88 MCG tablet Take 1 tablet by mouth daily 6/10/21  Yes Mesreet Comas, DO   etonogestrel-ethinyl estradiol (NUVARING) 0.12-0.015 MG/24HR vaginal ring CHANGE EVERY 3 WEEKS 10/3/20  Yes Historical Provider, MD   Multiple Vitamins-Minerals (MULTIVITAMIN ADULT) TABS Take by mouth   Yes Historical Provider, MD       Allergies   Allergen Reactions    Bee Venom Anaphylaxis       Social History     Socioeconomic History    Marital status:      Spouse name: Not on file    Number of children: Not on file    Years of education: Not on file    Highest education level: Not on file   Occupational History    Not on file   Tobacco Use    Smoking status: Former Smoker    Smokeless tobacco: Never Used   Vaping Use    Vaping Use: Never used   Substance and Sexual Activity    Alcohol use: Not Currently    Drug use: Not Currently    Sexual activity: Not on file   Other Topics Concern    Not on file   Social History Narrative    Not on file     Social Determinants of Health     Financial Resource Strain: Medium Risk    Difficulty of Paying Living Expenses: Somewhat hard   Food Insecurity: No Food Insecurity    Worried About Running Out of Food in the Last Year: Never true    Nataliya of Food in the Last Year: Never true   Transportation Needs:     Lack of Transportation (Medical):      Lack of Transportation (Non-Medical):    Physical Activity:     Days of Exercise per Week:     Minutes of Exercise per Session:    Stress:     Feeling of Stress :    Social Connections:     Frequency of Communication with Friends and Family:     Frequency of Social Gatherings with Friends and Family:     Attends [employer] Services:     Active medrol dose pack helped last visit.       Short course of Norco for prn use. # 14 tabs given last visit. She would like refrain from taking opioids. States that it was somewhat helpful.     Relafen - ineffective.     Zanaflex causes drowsiness    Will try low dose gabapentin 100 mg TID. She will let me know if it is helping in a couple weeks.      Patient would like to refrain from injections at this time.     Referral to chiropractic care                  Counseling reg: HEP and appropriate medication use.                     Controlled Substance Monitoring:    Acute and Chronic Pain Monitoring:   RX Monitoring 7/22/2021   Periodic Controlled Substance Monitoring Possible medication side effects, risk of tolerance/dependence & alternative treatments discussed. ;No signs of potential drug abuse or diversion identified. ;Assessed functional status. We discussed with the patient that combining opioids, benzodiazepines, alcohol, illicit drugs or sleep aids increases the risk of respiratory depression including death. We discussed that these medications may cause drowsiness, sedation or dizziness and have counseled the patient not to drive or operate machinery. We have discussed that these medications will be prescribed only by one provider. We have discussed with the patient about age related risk factors and have thoroughly discussed the importance of taking these medications as prescribed. The patient verbalizes understanding.     ccreferring physic

## 2021-07-21 NOTE — LETTER
[address] [place] [address]  [place] [address]  Phone: [phone]  Fax: [place], [place]        July 21, 2021     Patient: John Clark   YOB: 1985   Date of Visit: 7/21/2021       To Whom it May Concern:    Jair Rosa was seen in my clinic on 7/21/2021. She is having a lower back flare and does not feel that she can sit for 3 hours. Please excuse her from class. If you have any questions or concerns, please don't hesitate to call.     Sincerely,         ANDRES Pickett

## 2021-07-22 ENCOUNTER — OFFICE VISIT (OUTPATIENT)
Dept: FAMILY MEDICINE CLINIC | Age: 36
End: 2021-07-22
Payer: COMMERCIAL

## 2021-07-22 VITALS
HEART RATE: 65 BPM | RESPIRATION RATE: 18 BRPM | SYSTOLIC BLOOD PRESSURE: 132 MMHG | BODY MASS INDEX: 23.92 KG/M2 | DIASTOLIC BLOOD PRESSURE: 84 MMHG | WEIGHT: 130 LBS | HEIGHT: 62 IN | TEMPERATURE: 97.5 F

## 2021-07-22 DIAGNOSIS — Z02.1 PRE-EMPLOYMENT HEALTH SCREENING EXAMINATION: ICD-10-CM

## 2021-07-22 DIAGNOSIS — Z02.1 PRE-EMPLOYMENT HEALTH SCREENING EXAMINATION: Primary | ICD-10-CM

## 2021-07-22 PROCEDURE — G8420 CALC BMI NORM PARAMETERS: HCPCS | Performed by: FAMILY MEDICINE

## 2021-07-22 PROCEDURE — 90471 IMMUNIZATION ADMIN: CPT | Performed by: FAMILY MEDICINE

## 2021-07-22 PROCEDURE — 90715 TDAP VACCINE 7 YRS/> IM: CPT | Performed by: FAMILY MEDICINE

## 2021-07-22 PROCEDURE — G8427 DOCREV CUR MEDS BY ELIG CLIN: HCPCS | Performed by: FAMILY MEDICINE

## 2021-07-22 PROCEDURE — 99213 OFFICE O/P EST LOW 20 MIN: CPT | Performed by: FAMILY MEDICINE

## 2021-07-22 PROCEDURE — 1036F TOBACCO NON-USER: CPT | Performed by: FAMILY MEDICINE

## 2021-07-22 ASSESSMENT — ENCOUNTER SYMPTOMS
SINUS PRESSURE: 0
EYE PAIN: 0
ABDOMINAL PAIN: 0
SORE THROAT: 0
CONSTIPATION: 0
BACK PAIN: 1
SHORTNESS OF BREATH: 0
COUGH: 0
DIARRHEA: 0

## 2021-07-22 NOTE — PROGRESS NOTES
Asael Hercules  : 1985    Chief Complaint:     Chief Complaint   Patient presents with    Employment Physical       HPI  Asael Hercules 38 y.o. presents for   Chief Complaint   Patient presents with    Employment Physical     Patient presents for employment physical.  She states she will be needing titers and a tetanus vaccination. She is certain that she has had vaccinations in the past for varicella, MMR and hep B. She does not have records of these however. She otherwise feels well denies any current complaints. She will be needing Mantoux testing and will return next week for that. Physically she feels well. She has no restrictions with lifting, bending, grasping or any other tactile issues. Vision and hearing are within normal limits. All questions were answered to patients satisfaction.     Past Medical History:   Diagnosis Date    Back pain     GERD (gastroesophageal reflux disease)     Hyperlipidemia     not on any medications    Hypothyroidism     Thyroid disease        Past Surgical History:   Procedure Laterality Date    EYE SURGERY      PAIN MANAGEMENT PROCEDURE Left 2020    LUMBAR EPIDURAL STEROID INJECTION UNDER FLUOROSCOPIC GUIDANCE AT L5-S1 LEFT PARAMEDIAN performed by Mian Balbuena MD at [facility] OR    PAIN MANAGEMENT PROCEDURE Bilateral 2021    LUMBAR TRANSFORAMINAL EPIDURAL STEROID INJECTION RIGHT L4 AND LEFT L5 UNDER FLUORO performed by Mian Balbuena MD at [facility] OR       Social History     Socioeconomic History    Marital status:      Spouse name: None    Number of children: None    Years of education: None    Highest education level: None   Occupational History    None   Tobacco Use    Smoking status: Former Smoker    Smokeless tobacco: Never Used   Vaping Use    Vaping Use: Never used   Substance and Sexual Activity    Alcohol use: Not Currently    Drug use: Not Currently    Sexual activity: None   Other Topics Concern    None   Social History Narrative    None     Social Determinants of Health     Financial Resource Strain: Medium Risk    Difficulty of Paying Living Expenses: Somewhat hard   Food Insecurity: No Food Insecurity    Worried About Running Out of Food in the Last Year: Never true    Nataliya of Food in the Last Year: Never true   Transportation Needs:     Lack of Transportation (Medical):  Lack of Transportation (Non-Medical):    Physical Activity:     Days of Exercise per Week:     Minutes of Exercise per Session:    Stress:     Feeling of Stress :    Social Connections:     Frequency of Communication with Friends and Family:     Frequency of Social Gatherings with Friends and Family:     Attends Evangelical Services:     Active Member of Clubs or Organizations:     Attends Club or Organization Meetings:     Marital Status:    Intimate Partner Violence:     Fear of Current or Ex-Partner:     Emotionally Abused:     Physically Abused:     Sexually Abused:        Family History   Problem Relation Age of Onset    Cancer Father     Hypertension Mother           Current Outpatient Medications   Medication Sig Dispense Refill    gabapentin (NEURONTIN) 100 MG capsule Take 1 capsule by mouth 3 times daily for 30 days. 90 capsule 0    DULoxetine (CYMBALTA) 30 MG extended release capsule Take 1 capsule by mouth daily 30 capsule 5    levothyroxine (SYNTHROID) 88 MCG tablet Take 1 tablet by mouth daily 30 tablet 2    etonogestrel-ethinyl estradiol (NUVARING) 0.12-0.015 MG/24HR vaginal ring CHANGE EVERY 3 WEEKS      Multiple Vitamins-Minerals (MULTIVITAMIN ADULT) TABS Take by mouth       No current facility-administered medications for this visit.        Allergies   Allergen Reactions    Bee Venom Anaphylaxis       Health Maintenance Due   Topic Date Due    Varicella vaccine (1 of 2 - 2-dose childhood series) Never done    COVID-19 Vaccine (1) Never done    Cervical cancer screen  Never done REVIEW OF SYSTEMS  Review of Systems   Constitutional: Negative for fatigue and fever. HENT: Negative for ear pain, sinus pressure, sneezing and sore throat. Eyes: Negative for pain. Respiratory: Negative for cough and shortness of breath. Cardiovascular: Negative for chest pain and leg swelling. Gastrointestinal: Negative for abdominal pain, constipation and diarrhea. Genitourinary: Negative for dysuria and urgency. Musculoskeletal: Positive for arthralgias and back pain. Negative for myalgias. Skin: Negative for rash. Allergic/Immunologic: Negative for food allergies. Neurological: Negative for light-headedness and headaches. Hematological: Does not bruise/bleed easily. Psychiatric/Behavioral: Negative for behavioral problems, dysphoric mood and sleep disturbance. The patient is not nervous/anxious. PHYSICAL EXAM  /84   Pulse 65   Temp 97.5 °F (36.4 °C)   Resp 18   Ht 5' 2\" (1.575 m)   Wt 130 lb (59 kg)   LMP 07/22/2021   BMI 23.78 kg/m²   Physical Exam  Vitals and nursing note reviewed. Constitutional:       Appearance: She is well-developed. HENT:      Head: Normocephalic and atraumatic. Right Ear: External ear normal.      Left Ear: External ear normal.      Nose: Nose normal.   Eyes:      Conjunctiva/sclera: Conjunctivae normal.   Neck:      Thyroid: No thyromegaly. Cardiovascular:      Rate and Rhythm: Normal rate and regular rhythm. Pulmonary:      Effort: Pulmonary effort is normal.      Breath sounds: Normal breath sounds. Abdominal:      Palpations: Abdomen is soft. Tenderness: There is no abdominal tenderness. Musculoskeletal:         General: No tenderness. Normal range of motion. Cervical back: Normal range of motion and neck supple. Lymphadenopathy:      Cervical: No cervical adenopathy. Skin:     General: Skin is warm and dry. Findings: No rash.    Neurological:      Mental Status: She is alert and oriented to person, place, and time. Deep Tendon Reflexes: Reflexes are normal and symmetric. Psychiatric:         Behavior: Behavior normal.              Laboratory: All laboratory and radiology results have been personally reviewed by myself    Lab Results   Component Value Date     02/03/2021    K 4.1 02/03/2021     02/03/2021    CO2 26 02/03/2021    BUN 8 02/03/2021    CREATININE 0.9 02/03/2021    PROT 7.8 01/22/2016    LABALBU 4.4 01/22/2016    CALCIUM 9.3 02/03/2021    GFRAA >60 02/03/2021    LABGLOM >60 02/03/2021    GLUCOSE 87 02/03/2021    AST 21 01/22/2016    ALT 15 01/22/2016    ALKPHOS 55 01/22/2016    BILITOT 0.2 01/22/2016    TSH 18.030 05/18/2021    VITD25 34 02/03/2021    CHOL 151 07/02/2019    TRIG 45 07/02/2019    HDL 48 07/02/2019    LDLCALC 94 07/02/2019    LABA1C 4.8 07/02/2019        Lab Results   Component Value Date    CHOL 151 07/02/2019    CHOL 131 01/22/2016    CHOL 159 02/03/2015     Lab Results   Component Value Date    TRIG 45 07/02/2019    TRIG 61 01/22/2016    TRIG 84 02/03/2015     Lab Results   Component Value Date    HDL 48 07/02/2019    HDL 44 01/22/2016    HDL 43 02/03/2015     Lab Results   Component Value Date    LDLCALC 94 07/02/2019    LDLCALC 75 01/22/2016    LDLCALC 99 02/03/2015       Lab Results   Component Value Date    LABA1C 4.8 07/02/2019    LABA1C 5.3 01/22/2016    LABA1C 5.4 02/03/2015     Lab Results   Component Value Date    LDLCALC 94 07/02/2019    CREATININE 0.9 02/03/2021       ASSESSMENT/PLAN:     Diagnosis Orders   1. Pre-employment health screening examination  VARICELLA ZOSTER ANTIBODY, IGG    Measles/Mumps/Rubella Immunity    HEPATITIS B SURFACE ANTIBODY    Tdap (age 6y and older) IM (BOOSTRIX)     Titers to be completed as above. Tdap updated today. She will return next week for Mantoux testing. Forms filled out and will fill out rest once titers completed. Patient agreeable to the above.     Problem list reviewed andsimplified/updated  HM reviewed today and counseled as appropriate    Call or go to ED immediately if symptoms worsen or persist.  Future Appointments   Date Time Provider Magdi Calderon   7/26/2021 10:50 AM SCHEDULE, CHRIS MARIE [facility]   8/17/2021 10:15 AM ANDRES Up [facility] PAIN [facility]   11/22/2021  2:00 PM Sara Arguelles MD [facility]     Or sooner if necessary. Educational materials and/or homeexercises printed for patient's review and were included in patient instructions on his/her After Visit Summary and given to patient at the end of visit.       Counseled regarding above diagnosis, including possible risks and complications,  especially if left uncontrolled.     Counseled regarding the possible side effects, risks, benefits and alternatives to treatment; patient and/or guardian verbalizes understanding, agrees,feels comfortable with and wishes to proceed with above treatment plan.     Advised patient to call [facility] Thomas new medication issues, and read all Rx info from pharmacy to assure aware of all possible risks and side effects of medication before taking.     Reviewed age and gender appropriate health screening exams and vaccinations. Advised patient regarding importance of keeping up with recommended health maintenance and toschedule as soon as possible if overdue, as this is important in assessing for undiagnosed pathology, especially cancer, as well as protecting against potentially harmful/life threatening disease.          Patient and/or guardian verbalizes understanding and agrees with above counseling, assessment and plan.     All questions answered. Ellie 5, DO  7/22/21    NOTE: This report was transcribed using voice recognition software.  Every effort was made to ensure accuracy; however, inadvertent computerized transcription errors may be present

## 2021-07-23 LAB — HBV SURFACE AB TITR SER: REACTIVE {TITER}

## 2021-07-26 ENCOUNTER — NURSE ONLY (OUTPATIENT)
Dept: FAMILY MEDICINE CLINIC | Age: 36
End: 2021-07-26
Payer: COMMERCIAL

## 2021-07-26 DIAGNOSIS — Z11.1 VISIT FOR TB SKIN TEST: Primary | ICD-10-CM

## 2021-07-26 LAB
MEASLES IMMUNE (IGG): NORMAL
MUMPS AB IGG: NORMAL
RUBELLA ANTIBODY IGG: NORMAL

## 2021-07-26 PROCEDURE — 86580 TB INTRADERMAL TEST: CPT | Performed by: FAMILY MEDICINE

## 2021-07-27 LAB — VARICELLA-ZOSTER VIRUS AB, IGG: NORMAL

## 2021-07-28 ENCOUNTER — NURSE ONLY (OUTPATIENT)
Dept: FAMILY MEDICINE CLINIC | Age: 36
End: 2021-07-28

## 2021-07-28 DIAGNOSIS — Z11.1 VISIT FOR TB SKIN TEST: Primary | ICD-10-CM

## 2021-09-16 ENCOUNTER — VIRTUAL VISIT (OUTPATIENT)
Dept: FAMILY MEDICINE CLINIC | Age: 36
End: 2021-09-16
Payer: COMMERCIAL

## 2021-09-16 DIAGNOSIS — Z28.9 COVID-19 VACCINATION NOT DONE: Primary | ICD-10-CM

## 2021-09-16 PROCEDURE — 99213 OFFICE O/P EST LOW 20 MIN: CPT | Performed by: FAMILY MEDICINE

## 2021-09-16 PROCEDURE — G8427 DOCREV CUR MEDS BY ELIG CLIN: HCPCS | Performed by: FAMILY MEDICINE

## 2021-09-16 PROCEDURE — 1036F TOBACCO NON-USER: CPT | Performed by: FAMILY MEDICINE

## 2021-09-16 PROCEDURE — G8420 CALC BMI NORM PARAMETERS: HCPCS | Performed by: FAMILY MEDICINE

## 2021-09-17 NOTE — PROGRESS NOTES
This visit was performed as a virtual video visit using a synchronous, two-way, audio-video telehealth technology platform. Patient identification was verified at the start of the visit, including the patient's telephone number and physical location. I discussed with the patient the nature of our telehealth visits, that:     1. Due to the nature of an audio- video modality, the only components of a physical exam that could be done are the elements supported by direct observation. 2. I would evaluate the patient and recommend diagnostics and treatments based on my assessment. 3. If it was felt that the patient should be evaluated in clinic or an emergency room setting, then they would be directed there. 4. Our sessions are not being recorded and that personal health information is protected. 5. Our team would provide follow up care in person if/when the patient needs it. Patient does agree to proceed with telemedicine consultation. Patient's location: home address in [facility]    Physician  location [place] [id number] office   other people involved in call: none        Time spent: Greater than Not billed by time    This visit was completed virtually using Doxy. ata Limany Leno  : 1985    Chief Complaint:     Chief Complaint   Patient presents with    Immunizations     discuss        HPI  Jeremy Burr 38 y.o. presents for   Chief Complaint   Patient presents with    Immunizations     discuss      Patient has concerns over the COVID-19 vaccine. She states that her school is mandating that she gets vaccinated. She is currently in nursing school. She is concerned about the vaccine in general.  She is not sure which vaccination to get. We did have a long discussion today about the vaccines, side effects. All questions were answered to patients satisfaction.     Past Medical History:   Diagnosis Date    Back pain     GERD (gastroesophageal reflux disease)     Hyperlipidemia     not on any medications    Hypothyroidism     Thyroid disease        Past Surgical History:   Procedure Laterality Date    EYE SURGERY      PAIN MANAGEMENT PROCEDURE Left 11/23/2020    LUMBAR EPIDURAL STEROID INJECTION UNDER FLUOROSCOPIC GUIDANCE AT L5-S1 LEFT PARAMEDIAN performed by Keyshawn Gonzalez MD at [facility] OR    PAIN MANAGEMENT PROCEDURE Bilateral 1/7/2021    LUMBAR TRANSFORAMINAL EPIDURAL STEROID INJECTION RIGHT L4 AND LEFT L5 UNDER FLUORO performed by Keyshawn Gonzalez MD at [address] [place] [address] History     Socioeconomic History    Marital status:      Spouse name: Not on file    Number of children: Not on file    Years of education: Not on file    Highest education level: Not on file   Occupational History    Not on file   Tobacco Use    Smoking status: Former Smoker    Smokeless tobacco: Never Used   Vaping Use    Vaping Use: Never used   Substance and Sexual Activity    Alcohol use: Not Currently    Drug use: Not Currently    Sexual activity: Not on file   Other Topics Concern    Not on file   Social History Narrative    Not on file     Social Determinants of Health     Financial Resource Strain: Medium Risk    Difficulty of Paying Living Expenses: Somewhat hard   Food Insecurity: No Food Insecurity    Worried About Running Out of Food in the Last Year: Never true    Nataliya of Food in the Last Year: Never true   Transportation Needs:     Lack of Transportation (Medical):      Lack of Transportation (Non-Medical):    Physical Activity:     Days of Exercise per Week:     Minutes of Exercise per Session:    Stress:     Feeling of Stress :    Social Connections:     Frequency of Communication with Friends and Family:     Frequency of Social Gatherings with Friends and Family:     Attends Synagogue Services:     Active Member of Clubs or Organizations:     Attends Club or Organization Meetings:     Marital Status:    Intimate Partner Violence:     Fear of Current or Ex-Partner:     Emotionally Abused:     Physically Abused:     Sexually Abused:        Family History   Problem Relation Age of Onset    Cancer Father     Hypertension Mother           Current Outpatient Medications   Medication Sig Dispense Refill    gabapentin (NEURONTIN) 100 MG capsule Take 1 capsule by mouth 3 times daily for 30 days. 90 capsule 0    DULoxetine (CYMBALTA) 30 MG extended release capsule Take 1 capsule by mouth daily 30 capsule 5    levothyroxine (SYNTHROID) 88 MCG tablet Take 1 tablet by mouth daily 30 tablet 2    etonogestrel-ethinyl estradiol (NUVARING) 0.12-0.015 MG/24HR vaginal ring CHANGE EVERY 3 WEEKS      Multiple Vitamins-Minerals (MULTIVITAMIN ADULT) TABS Take by mouth       No current facility-administered medications for this visit. Allergies   Allergen Reactions    Bee Venom Anaphylaxis       Health Maintenance Due   Topic Date Due    Varicella vaccine (1 of 2 - 2-dose childhood series) Never done    COVID-19 Vaccine (1) Never done    Cervical cancer screen  Never done    Flu vaccine (1) 09/01/2021           REVIEW OF SYSTEMS  Review of Systems   All other systems reviewed and are negative. PHYSICAL EXAM  There were no vitals taken for this visit. Physical Exam  Vitals reviewed. Constitutional:       Appearance: Normal appearance. She is normal weight. HENT:      Head: Normocephalic. Nose: Nose normal.   Eyes:      Extraocular Movements: Extraocular movements intact. Conjunctiva/sclera: Conjunctivae normal.   Pulmonary:      Effort: Pulmonary effort is normal.   Musculoskeletal:         General: Normal range of motion. Cervical back: Normal range of motion. Skin:     Findings: No rash. Neurological:      General: No focal deficit present. Mental Status: She is alert and oriented to person, place, and time. Mental status is at baseline.    Psychiatric:         Mood and Affect: Mood normal.         Behavior: Behavior normal. Thought Content: Thought content normal.         Judgment: Judgment normal.              Laboratory: All laboratory and radiology results have been personally reviewed by myself    Lab Results   Component Value Date     02/03/2021    K 4.1 02/03/2021     02/03/2021    CO2 26 02/03/2021    BUN 8 02/03/2021    CREATININE 0.9 02/03/2021    PROT 7.8 01/22/2016    LABALBU 4.4 01/22/2016    CALCIUM 9.3 02/03/2021    GFRAA >60 02/03/2021    LABGLOM >60 02/03/2021    GLUCOSE 87 02/03/2021    AST 21 01/22/2016    ALT 15 01/22/2016    ALKPHOS 55 01/22/2016    BILITOT 0.2 01/22/2016    TSH 18.030 05/18/2021    VITD25 34 02/03/2021    CHOL 151 07/02/2019    TRIG 45 07/02/2019    HDL 48 07/02/2019    LDLCALC 94 07/02/2019    LABA1C 4.8 07/02/2019        Lab Results   Component Value Date    CHOL 151 07/02/2019    CHOL 131 01/22/2016    CHOL 159 02/03/2015     Lab Results   Component Value Date    TRIG 45 07/02/2019    TRIG 61 01/22/2016    TRIG 84 02/03/2015     Lab Results   Component Value Date    HDL 48 07/02/2019    HDL 44 01/22/2016    HDL 43 02/03/2015     Lab Results   Component Value Date    LDLCALC 94 07/02/2019    LDLCALC 75 01/22/2016    LDLCALC 99 02/03/2015       Lab Results   Component Value Date    LABA1C 4.8 07/02/2019    LABA1C 5.3 01/22/2016    LABA1C 5.4 02/03/2015     Lab Results   Component Value Date    LDLCALC 94 07/02/2019    CREATININE 0.9 02/03/2021       ASSESSMENT/PLAN:     Diagnosis Orders   1. COVID-19 vaccination not done       We did have a long discussion today about the COVID-19 vaccine. Advice was given about the vaccine and potential side effects to this. Her school is mandating her to get this vaccine which I do think she would tolerate. Patient will make a decision by the end of this week whether or not she will get the vaccine.   All questions answered    More than 20 minutes was spent with the patient during the encounter, during which more than half the time was spent counseling on the above concerns    Problem list reviewed andsimplified/updated  HM reviewed today and counseled as appropriate    Call or go to ED immediately if symptoms worsen or persist.  Future Appointments   Date Time Provider Magdi Calderon   11/22/2021  2:00 PM Jason Akers MD [facility]     Or sooner if necessary. Educational materials and/or homeexercises printed for patient's review and were included in patient instructions on his/her After Visit Summary and given to patient at the end of visit. Counseled regarding above diagnosis, including possible risks and complications,  especially if left uncontrolled. Counseled regarding the possible side effects, risks, benefits and alternatives to treatment; patient and/or guardian verbalizes understanding, agrees,feels comfortable with and wishes to proceed with above treatment plan. Advised patient to call David Barrios new medication issues, and read all Rx info from pharmacy to assure aware of all possible risks and side effects of medication before taking. Reviewed age and gender appropriate health screening exams and vaccinations. Advised patient regarding importance of keeping up with recommended health maintenance and toschedule as soon as possible if overdue, as this is important in assessing for undiagnosed pathology, especially cancer, as well as protecting against potentially harmful/life threatening disease. Patient and/or guardian verbalizes understanding and agrees with above counseling, assessment and plan. All questions answered. Meseret Lozada DO  9/17/21    NOTE: This report was transcribed using voice recognition software. Every effort was made to ensure accuracy; however, inadvertent computerized transcription errors may be present    Elvie Sommer is a 38 y.o. female being evaluated by a Virtual Visit (video visit) encounter to address concerns as mentioned above.   A caregiver was present when appropriate. Due to this being a TeleHealth encounter (During ZADWL-08 public health emergency), evaluation of the following organ systems was limited: Vitals/Constitutional/EENT/Resp/CV/GI//MS/Neuro/Skin/Heme-Lymph-Imm. Pursuant to the emergency declaration under the [address] [place] [address], [address] [place] [address] and the [employer] and [employer] Act, this Virtual Visit was conducted with patient's (and/or legal guardian's) consent, to reduce the patient's risk of exposure to COVID-19 and provide necessary medical care. The patient (and/or legal guardian) has also been advised to contact this office for worsening conditions or problems, and seek emergency medical treatment and/or call 911 if deemed necessary.

## 2021-09-30 ENCOUNTER — HOSPITAL ENCOUNTER (OUTPATIENT)
Age: 36
Discharge: HOME | End: 2021-09-30
Payer: COMMERCIAL

## 2021-09-30 DIAGNOSIS — E03.9 ACQUIRED HYPOTHYROIDISM: ICD-10-CM

## 2021-09-30 LAB — TSH SERPL DL<=0.05 MIU/L-ACNC: 9.32 UIU/ML (ref 0.27–4.2)

## 2021-09-30 PROCEDURE — 36415 COLL VENOUS BLD VENIPUNCTURE: CPT

## 2021-09-30 PROCEDURE — 84443 ASSAY THYROID STIM HORMONE: CPT

## 2021-10-01 DIAGNOSIS — E03.9 ACQUIRED HYPOTHYROIDISM: ICD-10-CM

## 2021-10-01 RX ORDER — LEVOTHYROXINE SODIUM 0.1 MG/1
100 TABLET ORAL DAILY
Qty: 30 TABLET | Refills: 2 | Status: SHIPPED
Start: 2021-10-01 | End: 2021-12-30

## 2021-10-29 ENCOUNTER — NURSE ONLY (OUTPATIENT)
Dept: FAMILY MEDICINE CLINIC | Age: 36
End: 2021-10-29
Payer: COMMERCIAL

## 2021-10-29 DIAGNOSIS — Z23 NEED FOR INFLUENZA VACCINATION: Primary | ICD-10-CM

## 2021-10-29 PROCEDURE — 99999 PR OFFICE/OUTPT VISIT,PROCEDURE ONLY: CPT | Performed by: FAMILY MEDICINE

## 2021-10-29 PROCEDURE — 90674 CCIIV4 VAC NO PRSV 0.5 ML IM: CPT | Performed by: FAMILY MEDICINE

## 2021-10-29 PROCEDURE — 90471 IMMUNIZATION ADMIN: CPT | Performed by: FAMILY MEDICINE

## 2021-12-09 ENCOUNTER — PATIENT MESSAGE (OUTPATIENT)
Dept: FAMILY MEDICINE CLINIC | Age: 36
End: 2021-12-09

## 2021-12-09 NOTE — TELEPHONE ENCOUNTER
From: Charanjit Valverde  To: Dr. Inna Kinney: 12/9/2021 3:22 PM EST  Subject: Prescription Question    Hi Dr. Genesis Valdivia, I was just wondering if we could try to get that deluxatine increased? I know the last time you went to increase it the insurance company wouldn't take it for some reason. I feel like I've kind of built up a tolerance to the 30, having some symptoms again. It could be the time of year too and school, but I would like to try to get it increased. If the insurance wouldn't cover the increase, do you happen to know what I would pay out of pocket? Please let me know what we could do, thank you!

## 2021-12-10 RX ORDER — DULOXETIN HYDROCHLORIDE 60 MG/1
60 CAPSULE, DELAYED RELEASE ORAL DAILY
Qty: 30 CAPSULE | Refills: 2 | Status: SHIPPED
Start: 2021-12-10 | End: 2021-12-20 | Stop reason: SDUPTHER

## 2021-12-20 RX ORDER — DULOXETIN HYDROCHLORIDE 60 MG/1
60 CAPSULE, DELAYED RELEASE ORAL DAILY
Qty: 30 CAPSULE | Refills: 2 | Status: SHIPPED | OUTPATIENT
Start: 2021-12-20 | End: 2022-06-13 | Stop reason: SDUPTHER

## 2021-12-30 DIAGNOSIS — E03.9 ACQUIRED HYPOTHYROIDISM: Primary | ICD-10-CM

## 2021-12-30 DIAGNOSIS — E03.9 ACQUIRED HYPOTHYROIDISM: ICD-10-CM

## 2021-12-30 RX ORDER — LEVOTHYROXINE SODIUM 0.1 MG/1
TABLET ORAL
Qty: 30 TABLET | Refills: 0 | Status: SHIPPED
Start: 2021-12-30 | End: 2022-03-10 | Stop reason: SDUPTHER

## 2022-03-09 ENCOUNTER — HOSPITAL ENCOUNTER (OUTPATIENT)
Age: 37
Discharge: HOME | End: 2022-03-09
Payer: COMMERCIAL

## 2022-03-09 DIAGNOSIS — E03.9 ACQUIRED HYPOTHYROIDISM: ICD-10-CM

## 2022-03-09 PROCEDURE — 36415 COLL VENOUS BLD VENIPUNCTURE: CPT

## 2022-03-09 PROCEDURE — 84443 ASSAY THYROID STIM HORMONE: CPT

## 2022-03-10 DIAGNOSIS — E03.9 ACQUIRED HYPOTHYROIDISM: ICD-10-CM

## 2022-03-10 LAB — TSH SERPL DL<=0.05 MIU/L-ACNC: 2.23 UIU/ML (ref 0.27–4.2)

## 2022-03-10 RX ORDER — LEVOTHYROXINE SODIUM 0.1 MG/1
TABLET ORAL
Qty: 30 TABLET | Refills: 5 | Status: SHIPPED | OUTPATIENT
Start: 2022-03-10 | End: 2022-09-09 | Stop reason: SDUPTHER

## 2022-05-05 ENCOUNTER — OFFICE VISIT (OUTPATIENT)
Dept: FAMILY MEDICINE CLINIC | Age: 37
End: 2022-05-05
Payer: COMMERCIAL

## 2022-05-05 VITALS
RESPIRATION RATE: 18 BRPM | DIASTOLIC BLOOD PRESSURE: 85 MMHG | HEIGHT: 62 IN | SYSTOLIC BLOOD PRESSURE: 133 MMHG | WEIGHT: 150 LBS | BODY MASS INDEX: 27.6 KG/M2 | HEART RATE: 72 BPM | TEMPERATURE: 97.4 F

## 2022-05-05 DIAGNOSIS — F98.8 ATTENTION DEFICIT DISORDER (ADD) WITHOUT HYPERACTIVITY: Primary | ICD-10-CM

## 2022-05-05 DIAGNOSIS — E03.9 ACQUIRED HYPOTHYROIDISM: ICD-10-CM

## 2022-05-05 PROCEDURE — 1036F TOBACCO NON-USER: CPT | Performed by: FAMILY MEDICINE

## 2022-05-05 PROCEDURE — 99213 OFFICE O/P EST LOW 20 MIN: CPT | Performed by: FAMILY MEDICINE

## 2022-05-05 PROCEDURE — G8419 CALC BMI OUT NRM PARAM NOF/U: HCPCS | Performed by: FAMILY MEDICINE

## 2022-05-05 PROCEDURE — G8427 DOCREV CUR MEDS BY ELIG CLIN: HCPCS | Performed by: FAMILY MEDICINE

## 2022-05-05 RX ORDER — ATOMOXETINE 18 MG/1
18 CAPSULE ORAL DAILY
Qty: 30 CAPSULE | Refills: 3 | Status: SHIPPED | OUTPATIENT
Start: 2022-05-05 | End: 2022-06-13

## 2022-05-05 ASSESSMENT — ENCOUNTER SYMPTOMS
DIARRHEA: 0
SORE THROAT: 0
SHORTNESS OF BREATH: 0
EYE PAIN: 0
CONSTIPATION: 0
COUGH: 0
SINUS PRESSURE: 0
BACK PAIN: 0
ABDOMINAL PAIN: 0

## 2022-05-05 NOTE — PROGRESS NOTES
Alisa Webster  : 1985    Chief Complaint:     Chief Complaint   Patient presents with    Fatigue       HPI  Alisa Webster 39 y.o. presents for   Chief Complaint   Patient presents with    Fatigue     Presents for follow-up. She has noted an issue with her attention especially when she is trying to study. She states that she gets fatigued when she tries to study. She can fall asleep right away. However she does not fall asleep when doing other activities. Her thyroid has been within normal limits. All questions were answered to patients satisfaction. Past Medical History:   Diagnosis Date    Back pain     GERD (gastroesophageal reflux disease)     Hyperlipidemia     not on any medications    Hypothyroidism     Thyroid disease        Allergies   Allergen Reactions    Bee Venom Anaphylaxis       Health Maintenance Due   Topic Date Due    Varicella vaccine (1 of 2 - 2-dose childhood series) Never done    Cervical cancer screen  Never done    Depression Monitoring  2022         REVIEW OF SYSTEMS  Review of Systems   Constitutional: Positive for fatigue. Negative for fever. HENT: Negative for ear pain, sinus pressure, sneezing and sore throat. Eyes: Negative for pain. Respiratory: Negative for cough and shortness of breath. Cardiovascular: Negative for chest pain and leg swelling. Gastrointestinal: Negative for abdominal pain, constipation and diarrhea. Genitourinary: Negative for dysuria and urgency. Musculoskeletal: Negative for back pain and myalgias. Skin: Negative for rash. Allergic/Immunologic: Negative for food allergies. Neurological: Negative for light-headedness and headaches. Hematological: Does not bruise/bleed easily. Psychiatric/Behavioral: Negative for behavioral problems and sleep disturbance.        PHYSICAL EXAM  /85   Pulse 72   Temp 97.4 °F (36.3 °C)   Resp 18   Ht 5' 2\" (1.575 m)   Wt 150 lb (68 kg)   BMI 27.44 kg/m²   Physical Exam  Vitals reviewed. Constitutional:       Appearance: Normal appearance. She is normal weight. HENT:      Head: Normocephalic. Nose: Nose normal.   Eyes:      Extraocular Movements: Extraocular movements intact. Conjunctiva/sclera: Conjunctivae normal.   Pulmonary:      Effort: Pulmonary effort is normal.   Musculoskeletal:         General: Normal range of motion. Cervical back: Normal range of motion. Skin:     Findings: No rash. Neurological:      General: No focal deficit present. Mental Status: She is alert and oriented to person, place, and time. Mental status is at baseline. Psychiatric:         Mood and Affect: Mood normal.         Behavior: Behavior normal.         Thought Content: Thought content normal.         Judgment: Judgment normal.              Laboratory: All laboratory and radiology results have been personally reviewed by myself    Lab Results   Component Value Date     02/03/2021    K 4.1 02/03/2021     02/03/2021    CO2 26 02/03/2021    BUN 8 02/03/2021    CREATININE 0.9 02/03/2021    PROT 7.8 01/22/2016    LABALBU 4.4 01/22/2016    CALCIUM 9.3 02/03/2021    GFRAA >60 02/03/2021    LABGLOM >60 02/03/2021    GLUCOSE 87 02/03/2021    AST 21 01/22/2016    ALT 15 01/22/2016    ALKPHOS 55 01/22/2016    BILITOT 0.2 01/22/2016    TSH 2.230 03/09/2022    VITD25 34 02/03/2021    CHOL 151 07/02/2019    TRIG 45 07/02/2019    HDL 48 07/02/2019    LDLCALC 94 07/02/2019    LABA1C 4.8 07/02/2019        Lab Results   Component Value Date    CHOL 151 07/02/2019     Lab Results   Component Value Date    TRIG 45 07/02/2019     Lab Results   Component Value Date    HDL 48 07/02/2019     Lab Results   Component Value Date    LDLCALC 94 07/02/2019       Lab Results   Component Value Date    LABA1C 4.8 07/02/2019     Lab Results   Component Value Date    LDLCALC 94 07/02/2019    CREATININE 0.9 02/03/2021       ASSESSMENT/PLAN:    1.  Attention deficit disorder (ADD) without hyperactivity  2. Acquired hypothyroidism     Recommend to start Strattera. Side effects medication reviewed. She will follow-up with new PCP in the next month. Problem list reviewed andsimplified/updated  HM reviewed today and counseled as appropriate    Call or go to ED immediately if symptoms worsen or persist.  Future Appointments   Date Time Provider Magdi Calderon   6/8/2022  2:30 PM DO Sophie Pak [facility]     Or sooner if necessary. Educational materials and/or homeexercises printed for patient's review and were included in patient instructions on his/her After Visit Summary and given to patient at the end of visit. Counseled regarding above diagnosis, including possible risks and complications,  especially if left uncontrolled. Counseled regarding the possible side effects, risks, benefits and alternatives to treatment; patient and/or guardian verbalizes understanding, agrees,feels comfortable with and wishes to proceed with above treatment plan. Advised patient to call Torres Avila new medication issues, and read all Rx info from pharmacy to assure aware of all possible risks and side effects of medication before taking. Reviewed age and gender appropriate health screening exams and vaccinations. Advised patient regarding importance of keeping up with recommended health maintenance and toschedule as soon as possible if overdue, as this is important in assessing for undiagnosed pathology, especially cancer, as well as protecting against potentially harmful/life threatening disease. and/or guardian verbalizes understanding and agrees with above counseling, assessment and plan. All questions answered. Savi Alicia DO  5/5/22    NOTE: This report was transcribed using voice recognition software.  Every effort was made to ensure accuracy; however, inadvertent computerized transcription errors may be present

## 2022-05-09 DIAGNOSIS — T78.40XS ALLERGIC DISORDER, SEQUELA: ICD-10-CM

## 2022-05-10 RX ORDER — EPINEPHRINE 0.3 MG/.3ML
0.3 INJECTION SUBCUTANEOUS ONCE
Qty: 0.3 ML | Refills: 2 | Status: SHIPPED | OUTPATIENT
Start: 2022-05-10 | End: 2022-06-13

## 2022-06-13 ENCOUNTER — TELEPHONE (OUTPATIENT)
Dept: FAMILY MEDICINE CLINIC | Age: 37
End: 2022-06-13

## 2022-06-13 ENCOUNTER — OFFICE VISIT (OUTPATIENT)
Dept: FAMILY MEDICINE CLINIC | Age: 37
End: 2022-06-13
Payer: COMMERCIAL

## 2022-06-13 VITALS
BODY MASS INDEX: 26.79 KG/M2 | HEIGHT: 62 IN | TEMPERATURE: 98.3 F | HEART RATE: 62 BPM | DIASTOLIC BLOOD PRESSURE: 75 MMHG | RESPIRATION RATE: 18 BRPM | SYSTOLIC BLOOD PRESSURE: 113 MMHG | OXYGEN SATURATION: 100 % | WEIGHT: 145.6 LBS

## 2022-06-13 DIAGNOSIS — Z76.89 ENCOUNTER TO ESTABLISH CARE: Primary | ICD-10-CM

## 2022-06-13 DIAGNOSIS — E03.9 ACQUIRED HYPOTHYROIDISM: ICD-10-CM

## 2022-06-13 DIAGNOSIS — F98.8 ATTENTION DEFICIT DISORDER (ADD) WITHOUT HYPERACTIVITY: ICD-10-CM

## 2022-06-13 DIAGNOSIS — F41.9 ANXIETY: ICD-10-CM

## 2022-06-13 PROCEDURE — 99213 OFFICE O/P EST LOW 20 MIN: CPT | Performed by: NEUROMUSCULOSKELETAL MEDICINE & OMM

## 2022-06-13 RX ORDER — ATOMOXETINE 25 MG/1
25 CAPSULE ORAL DAILY
Qty: 30 CAPSULE | Refills: 0 | Status: SHIPPED
Start: 2022-06-13 | End: 2022-07-19 | Stop reason: SDUPTHER

## 2022-06-13 RX ORDER — DULOXETIN HYDROCHLORIDE 60 MG/1
60 CAPSULE, DELAYED RELEASE ORAL DAILY
Qty: 30 CAPSULE | Refills: 2 | Status: SHIPPED | OUTPATIENT
Start: 2022-06-13

## 2022-06-13 SDOH — HEALTH STABILITY: MENTAL HEALTH: HOW OFTEN DO YOU HAVE A DRINK CONTAINING ALCOHOL?: MONTHLY OR LESS

## 2022-06-13 SDOH — HEALTH STABILITY: PHYSICAL HEALTH: ON AVERAGE, HOW MANY MINUTES DO YOU ENGAGE IN EXERCISE AT THIS LEVEL?: 60 MIN

## 2022-06-13 SDOH — HEALTH STABILITY: PHYSICAL HEALTH: ON AVERAGE, HOW MANY DAYS PER WEEK DO YOU ENGAGE IN MODERATE TO STRENUOUS EXERCISE (LIKE A BRISK WALK)?: 3 DAYS

## 2022-06-13 SDOH — SOCIAL STABILITY: SOCIAL NETWORK: HOW OFTEN DO YOU ATTENT MEETINGS OF THE CLUB OR ORGANIZATION YOU BELONG TO?: NEVER

## 2022-06-13 SDOH — ECONOMIC STABILITY: TRANSPORTATION INSECURITY
IN THE PAST 12 MONTHS, HAS LACK OF TRANSPORTATION KEPT YOU FROM MEETINGS, WORK, OR FROM GETTING THINGS NEEDED FOR DAILY LIVING?: NO

## 2022-06-13 SDOH — SOCIAL STABILITY: SOCIAL NETWORK: HOW OFTEN DO YOU GET TOGETHER WITH FRIENDS OR RELATIVES?: ONCE A WEEK

## 2022-06-13 SDOH — SOCIAL STABILITY: SOCIAL NETWORK: ARE YOU MARRIED, WIDOWED, DIVORCED, SEPARATED, NEVER MARRIED, OR LIVING WITH A PARTNER?: LIVING WITH PARTNER

## 2022-06-13 SDOH — SOCIAL STABILITY: SOCIAL NETWORK: HOW OFTEN DO YOU ATTEND CHURCH OR RELIGIOUS SERVICES?: NEVER

## 2022-06-13 SDOH — SOCIAL STABILITY: SOCIAL INSECURITY: WITHIN THE LAST YEAR, HAVE YOU BEEN AFRAID OF YOUR PARTNER OR EX-PARTNER?: NO

## 2022-06-13 SDOH — ECONOMIC STABILITY: FOOD INSECURITY: WITHIN THE PAST 12 MONTHS, THE FOOD YOU BOUGHT JUST DIDN'T LAST AND YOU DIDN'T HAVE MONEY TO GET MORE.: NEVER TRUE

## 2022-06-13 SDOH — ECONOMIC STABILITY: FOOD INSECURITY: WITHIN THE PAST 12 MONTHS, YOU WORRIED THAT YOUR FOOD WOULD RUN OUT BEFORE YOU GOT MONEY TO BUY MORE.: NEVER TRUE

## 2022-06-13 SDOH — SOCIAL STABILITY: SOCIAL INSECURITY
WITHIN THE LAST YEAR, HAVE YOU BEEN KICKED, HIT, SLAPPED, OR OTHERWISE PHYSICALLY HURT BY YOUR PARTNER OR EX-PARTNER?: NO

## 2022-06-13 SDOH — HEALTH STABILITY: MENTAL HEALTH
STRESS IS WHEN SOMEONE FEELS TENSE, NERVOUS, ANXIOUS, OR CAN'T SLEEP AT NIGHT BECAUSE THEIR MIND IS TROUBLED. HOW STRESSED ARE YOU?: RATHER MUCH

## 2022-06-13 SDOH — ECONOMIC STABILITY: TRANSPORTATION INSECURITY
IN THE PAST 12 MONTHS, HAS THE LACK OF TRANSPORTATION KEPT YOU FROM MEDICAL APPOINTMENTS OR FROM GETTING MEDICATIONS?: NO

## 2022-06-13 SDOH — HEALTH STABILITY: MENTAL HEALTH: HOW MANY STANDARD DRINKS CONTAINING ALCOHOL DO YOU HAVE ON A TYPICAL DAY?: 1 OR 2

## 2022-06-13 SDOH — SOCIAL STABILITY: SOCIAL INSECURITY: WITHIN THE LAST YEAR, HAVE YOU BEEN HUMILIATED OR EMOTIONALLY ABUSED IN OTHER WAYS BY YOUR PARTNER OR EX-PARTNER?: NO

## 2022-06-13 SDOH — SOCIAL STABILITY: SOCIAL NETWORK: IN A TYPICAL WEEK, HOW MANY TIMES DO YOU TALK ON THE PHONE WITH FAMILY, FRIENDS, OR NEIGHBORS?: THREE TIMES A WEEK

## 2022-06-13 SDOH — ECONOMIC STABILITY: HOUSING INSECURITY: IN THE LAST 12 MONTHS, HOW MANY PLACES HAVE YOU LIVED?: 1

## 2022-06-13 SDOH — SOCIAL STABILITY: SOCIAL INSECURITY
WITHIN THE LAST YEAR, HAVE TO BEEN RAPED OR FORCED TO HAVE ANY KIND OF SEXUAL ACTIVITY BY YOUR PARTNER OR EX-PARTNER?: NO

## 2022-06-13 SDOH — SOCIAL STABILITY: SOCIAL NETWORK
DO YOU BELONG TO ANY CLUBS OR ORGANIZATIONS SUCH AS CHURCH GROUPS UNIONS, FRATERNAL OR ATHLETIC GROUPS, OR SCHOOL GROUPS?: NO

## 2022-06-13 SDOH — ECONOMIC STABILITY: INCOME INSECURITY: IN THE LAST 12 MONTHS, WAS THERE A TIME WHEN YOU WERE NOT ABLE TO PAY THE MORTGAGE OR RENT ON TIME?: NO

## 2022-06-13 ASSESSMENT — PATIENT HEALTH QUESTIONNAIRE - PHQ9
10. IF YOU CHECKED OFF ANY PROBLEMS, HOW DIFFICULT HAVE THESE PROBLEMS MADE IT FOR YOU TO DO YOUR WORK, TAKE CARE OF THINGS AT HOME, OR GET ALONG WITH OTHER PEOPLE: 0
SUM OF ALL RESPONSES TO PHQ QUESTIONS 1-9: 8
2. FEELING DOWN, DEPRESSED OR HOPELESS: 0
SUM OF ALL RESPONSES TO PHQ9 QUESTIONS 1 & 2: 0
1. LITTLE INTEREST OR PLEASURE IN DOING THINGS: 0
9. THOUGHTS THAT YOU WOULD BE BETTER OFF DEAD, OR OF HURTING YOURSELF: 0
6. FEELING BAD ABOUT YOURSELF - OR THAT YOU ARE A FAILURE OR HAVE LET YOURSELF OR YOUR FAMILY DOWN: 0
7. TROUBLE CONCENTRATING ON THINGS, SUCH AS READING THE NEWSPAPER OR WATCHING TELEVISION: 3
SUM OF ALL RESPONSES TO PHQ QUESTIONS 1-9: 8
5. POOR APPETITE OR OVEREATING: 1
SUM OF ALL RESPONSES TO PHQ QUESTIONS 1-9: 8
SUM OF ALL RESPONSES TO PHQ QUESTIONS 1-9: 8
4. FEELING TIRED OR HAVING LITTLE ENERGY: 1
3. TROUBLE FALLING OR STAYING ASLEEP: 3
8. MOVING OR SPEAKING SO SLOWLY THAT OTHER PEOPLE COULD HAVE NOTICED. OR THE OPPOSITE, BEING SO FIGETY OR RESTLESS THAT YOU HAVE BEEN MOVING AROUND A LOT MORE THAN USUAL: 0

## 2022-06-13 ASSESSMENT — SOCIAL DETERMINANTS OF HEALTH (SDOH): HOW HARD IS IT FOR YOU TO PAY FOR THE VERY BASICS LIKE FOOD, HOUSING, MEDICAL CARE, AND HEATING?: NOT HARD AT ALL

## 2022-06-13 ASSESSMENT — ENCOUNTER SYMPTOMS
CHEST TIGHTNESS: 0
STRIDOR: 0
COUGH: 0
SHORTNESS OF BREATH: 0
WHEEZING: 0
CHOKING: 0

## 2022-06-13 NOTE — PROGRESS NOTES
Philly Miranda (:  1985) is a 39 y.o. female,Established patient, here for evaluation of the following chief complaint(s):  Establish Care (prev PCP Dr Mela Medina), Hypothyroidism, and ADD         ASSESSMENT/PLAN:  1. Encounter to establish care  2. Attention deficit disorder (ADD) without hyperactivity  Assessment & Plan:  Adjusted Strattera dose from 18 to 25 mg daily. We will recheck patient in 1 month. Prescription was sent in for Strattera 25 mg 1 daily, dispense 30 with no refills. Orders:  -     atomoxetine (STRATTERA) 25 MG capsule; Take 1 capsule by mouth daily, Disp-30 capsule, R-0Normal  3. Acquired hypothyroidism  Assessment & Plan: We will continue same dose of Synthroid. Labs reviewed and last TSH was in 2022 it was normal.  4. Anxiety  Assessment & Plan:  Prescription was sent in for Cymbalta 60 mg 1 daily dispense 30, with 2 refills. Again I will advised the patient that it is pregnancy category C and that she should not take Cymbalta when trying to become pregnant or if she does become pregnant. Orders:  -     DULoxetine (CYMBALTA) 60 MG extended release capsule; Take 1 capsule by mouth daily, Disp-30 capsule, R-2Normal      Return in about 1 month (around 2022) for to monitor medical conditions. Subjective   SUBJECTIVE/OBJECTIVE:  HPI 26-year-old female here to establish care. Former patient of Dr. Mela Medina. Dr. Mela Medina saw her in beginning of May, 2022 for adult ADHD and started her on Strattera 18 mg daily. Patient is seen in some results but would like better improvement. I will adjust the dose to 25 mg daily. I will see her back in 1 month's time for evaluation of improvement of symptoms. I also refilled her Cymbalta at 60 mg daily. Patient states that she is sure may be trying to get pregnant soon. I advised her after looking up after her appointment that she should not take the Cymbalta it is category C. I will advise her of this narrative.   I will also advise her we will need to switch her off the medication if she does become pregnant. Review of Systems   Constitutional: Negative for activity change, appetite change and unexpected weight change. Respiratory: Negative for cough, choking, chest tightness, shortness of breath, wheezing and stridor. Cardiovascular: Negative for chest pain, palpitations and leg swelling. Neurological: Negative for weakness and headaches. Objective   /75 (Site: Left Upper Arm, Position: Sitting, Cuff Size: Medium Adult)   Pulse 62   Temp 98.3 °F (36.8 °C) (Temporal)   Resp 18   Ht 5' 2\" (1.575 m)   Wt 145 lb 9.6 oz (66 kg)   SpO2 100%   BMI 26.63 kg/m²     Physical Exam  Vitals and nursing note reviewed. Constitutional:       General: She is not in acute distress. Appearance: Normal appearance. She is not ill-appearing or diaphoretic. HENT:      Head: Normocephalic and atraumatic. Cardiovascular:      Rate and Rhythm: Normal rate and regular rhythm. Pulses: Normal pulses. Heart sounds: Normal heart sounds. No murmur heard. No friction rub. No gallop. Pulmonary:      Effort: Pulmonary effort is normal. No respiratory distress. Breath sounds: Normal breath sounds. No stridor. No wheezing, rhonchi or rales. Musculoskeletal:      Right lower leg: No edema. Left lower leg: No edema. Skin:     Findings: No rash. Neurological:      Mental Status: She is alert and oriented to person, place, and time.    Psychiatric:         Mood and Affect: Mood normal.         Behavior: Behavior normal.             Past Medical History:   Diagnosis Date    Back pain     GERD (gastroesophageal reflux disease)     Hyperlipidemia     not on any medications    Hypothyroidism     Thyroid disease         Past Surgical History:   Procedure Laterality Date    EYE SURGERY      PAIN MANAGEMENT PROCEDURE Left 11/23/2020    LUMBAR EPIDURAL STEROID INJECTION UNDER FLUOROSCOPIC GUIDANCE AT L5-S1 LEFT PARAMEDIAN performed by Sukh Agu[facility] MD at [facility] PAIN MANAGEMENT PROCEDURE Bilateral 1/7/2021    LUMBAR TRANSFORAMINAL EPIDURAL STEROID INJECTION RIGHT L4 AND LEFT L5 UNDER FLUORO performed by Sukh Aguayo MD at [facility] OR        The ASCVD Risk score (Jeanie Szymanski, et al., 2013) failed to calculate for the following reasons: The 2013 ASCVD risk score is only valid for ages 36 to 78     An electronic signature was used to authenticate this note.     --Brent Sanots DO

## 2022-06-13 NOTE — ASSESSMENT & PLAN NOTE
We will continue same dose of Synthroid.   Labs reviewed and last TSH was in March 2022 it was normal.

## 2022-06-13 NOTE — PATIENT INSTRUCTIONS
Patient Education        Hypothyroidism: Care Instructions  Your Care Instructions     When you have hypothyroidism, your body doesn't make enough thyroid hormone. This hormone helps your body use energy. If your thyroid level is low, you may feel tired, be constipated, have an increase in your blood pressure, or have dry skin or memory problems. You may also get cold easily, even when it iswarm. Women with low thyroid levels may have heavy menstrual periods. A blood test to find your thyroid-stimulating hormone (TSH) level is used tocheck for hypothyroidism. A high TSH level may mean that you have it. The treatment for hypothyroidism is thyroid hormone pills. You should start to feel better in 1 to 2 weeks. Most people need treatment for the rest of their lives. You will need regular visits with your doctor to make sure you are doingwell and that you have the right dose of medicine. Follow-up care is a key part of your treatment and safety. Be sure to make and go to all appointments, and call your doctor if you are having problems. It's also a good idea to know your test results and keep alist of the medicines you take. How can you care for yourself at home?  Take your thyroid hormone medicine exactly as prescribed. Call your doctor if you think you are having a problem with your medicine. Most people do not have side effects if they take the right amount of medicine regularly. ? Take the medicine 30 minutes before breakfast, and do not take it with calcium, vitamins, or iron. ? Do not take extra doses of your thyroid medicine. It will not help you get better any faster, and it may cause side effects. ? If you forget to take a dose, do NOT take a double dose of medicine. Take your usual dose the next day.  Tell your doctor about all prescription, herbal, or over-the-counter products you take.  Take care of yourself. Eat a healthy diet, get enough sleep, and get regular exercise.   When should you call for help? Call 911 anytime you think you may need emergency care. For example, call if:     You passed out (lost consciousness).      You have severe trouble breathing.      You have a very slow heartbeat (less than 60 beats a minute).      You have a low body temperature (95°F or below). Call your doctor now or seek immediate medical care if:     You feel tired, sluggish, or weak.      You have trouble remembering things or concentrating.      You do not begin to feel better 2 weeks after starting your medicine. Watch closely for changes in your health, and be sure to contact your doctor ifyou have any problems. Where can you learn more? Go to [email or web address].[email or web address] org and sign in to your [employer] account. Enter D290 in the [employer] box to learn more about \"Hypothyroidism: Care Instructions. \"     If you do not have an account, please click on the \"Sign Up Now\" link. Current as of: July 28, 2021               Content Version: 13.2  © 2006-2022 [employer]. Care instructions adapted under license by [facility] ([facility]). If you have questions about a medical condition or this instruction, always ask your healthcare professional. [place] [address] any warranty or liability for your use of this information. Patient Education        Anxiety Disorder: Care Instructions  Your Care Instructions     Anxiety is a normal reaction to stress. Difficult situations can cause you to have symptoms such as sweaty palms and a nervous feeling. In an anxiety disorder, the symptoms are far more severe. Constant worry, muscle tension, trouble sleeping, nausea and diarrhea, and other symptoms can make normal daily activities difficult or impossible. These symptoms may occur for no reason, and they can affect your work, school, or social life. Medicines, counseling, and self-care can all help. Follow-up care is a key part of your treatment and safety.  Be sure to make and go to all appointments, and call your doctor if you are having problems. It's also a good idea to know your test results and keep a list of the medicines you take. How can you care for yourself at home? · Take medicines exactly as directed. Call your doctor if you think you are having a problem with your medicine. · Go to your counseling sessions and follow-up appointments. · Recognize and accept your anxiety. Then, when you are in a situation that makes you anxious, say to yourself, \"This is not an emergency. I feel uncomfortable, but I am not in danger. I can keep going even if I feel anxious. \"  · Be kind to your body:  ? Relieve tension with exercise or a massage. ? Get enough rest.  ? Avoid alcohol, caffeine, nicotine, and illegal drugs. They can increase your anxiety level and cause sleep problems. ? Learn and do relaxation techniques. See below for more about these techniques. · Engage your mind. Get out and do something you enjoy. Go to a funny movie, or take a walk or hike. Plan your day. Having too much or too little to do can make you anxious. · Keep a record of your symptoms. Discuss your fears with a good friend or family member, or join a support group for people with similar problems. Talking to others sometimes relieves stress. · Get involved in social groups, or volunteer to help others. Being alone sometimes makes things seem worse than they are. · Get at least 30 minutes of exercise on most days of the week to relieve stress. Walking is a good choice. You also may want to do other activities, such as running, swimming, cycling, or playing tennis or team sports. Relaxation techniques  Do relaxation exercises 10 to 20 minutes a day. You can play soothing, relaxing music while you do them, if you wish. · Tell others in your house that you are going to do your relaxation exercises. Ask them not to disturb you.   · Find a comfortable place, away from all distractions and noise.  · Lie down on your back, or sit with your back straight. · Focus on your breathing. Make it slow and steady. · Breathe in through your nose. Breathe out through either your nose or mouth. · Breathe deeply, filling up the area between your navel and your rib cage. Breathe so that your belly goes up and down. · Do not hold your breath. · Breathe like this for 5 to 10 minutes. Notice the feeling of calmness throughout your whole body. As you continue to breathe slowly and deeply, relax by doing the following for another 5 to 10 minutes:  · Tighten and relax each muscle group in your body. You can begin at your toes and work your way up to your head. · Imagine your muscle groups relaxing and becoming heavy. · Empty your mind of all thoughts. · Let yourself relax more and more deeply. · Become aware of the state of calmness that surrounds you. · When your relaxation time is over, you can bring yourself back to alertness by moving your fingers and toes and then your hands and feet and then stretching and moving your entire body. Sometimes people fall asleep during relaxation, but they usually wake up shortly afterward. · Always give yourself time to return to full alertness before you drive a car or do anything that might cause an accident if you are not fully alert. Never play a relaxation tape while you drive a car. When should you call for help? Call 911 anytime you think you may need emergency care. For example, call if:    · You feel you cannot stop from hurting yourself or someone else. Keep the numbers for these national suicide hotlines: 1-654-318-TALK (4-350-261-344.156.2981) and 4-827-SCBPYBB (6-839.450.3336). If you or someone you know talks about suicide or feeling hopeless, get help right away.   Watch closely for changes in your health, and be sure to contact your doctor if:    · You have anxiety or fear that affects your life.     · You have symptoms of anxiety that are new or different from those you had before. Where can you learn more? Go to https://chpepiceweb.[email or web address] org and sign in to your [employer] account. Enter P754 in the [employer] box to learn more about \"Anxiety Disorder: Care Instructions. \"     If you do not have an account, please click on the \"Sign Up Now\" link. Current as of: September 23, 2020               Content Version: 12.9  © 2006-2021 [employer]. Care instructions adapted under license by [facility] ([facility]). If you have questions about a medical condition or this instruction, always ask your healthcare professional. [address] any warranty or liability for your use of this information.

## 2022-06-13 NOTE — TELEPHONE ENCOUNTER
Jie Casey did further research into our discussion about Cymbalta in pregnancy. It is a category C so I would not advise you taking it if you are trying to get pregnant or become pregnant. If you have any further questions let me know.     Dr. Calvillo Lies

## 2022-06-13 NOTE — ASSESSMENT & PLAN NOTE
Adjusted Strattera dose from 18 to 25 mg daily. We will recheck patient in 1 month. Prescription was sent in for Strattera 25 mg 1 daily, dispense 30 with no refills.

## 2022-06-13 NOTE — ASSESSMENT & PLAN NOTE
Prescription was sent in for Cymbalta 60 mg 1 daily dispense 30, with 2 refills. Again I will advised the patient that it is pregnancy category C and that she should not take Cymbalta when trying to become pregnant or if she does become pregnant.

## 2022-07-12 ENCOUNTER — TELEPHONE (OUTPATIENT)
Dept: PAIN MANAGEMENT | Age: 37
End: 2022-07-12

## 2022-07-12 DIAGNOSIS — M51.36 DDD (DEGENERATIVE DISC DISEASE), LUMBAR: Primary | ICD-10-CM

## 2022-07-12 RX ORDER — NABUMETONE 750 MG/1
750 TABLET, FILM COATED ORAL 2 TIMES DAILY PRN
Qty: 40 TABLET | Refills: 1 | Status: SHIPPED | OUTPATIENT
Start: 2022-07-12

## 2022-07-12 RX ORDER — HYDROCODONE BITARTRATE AND ACETAMINOPHEN 5; 325 MG/1; MG/1
1 TABLET ORAL 2 TIMES DAILY PRN
Qty: 14 TABLET | Refills: 0 | Status: SHIPPED | OUTPATIENT
Start: 2022-07-12 | End: 2022-07-19

## 2022-07-12 NOTE — TELEPHONE ENCOUNTER
Vicki Ambrocio called said she spoke with someone yesterday and advised that she was in a lot of pain. The pain is in her lower back, radiating to her hips and down both legs. She is requesting something for her pain to get her to her appointment 7/27/2022. She said she has to get to class, but because of the pain she is unable to get to class. Please advise.

## 2022-07-12 NOTE — TELEPHONE ENCOUNTER
I have prescribed Relafen ( to take twice a day as needed)   and Norco- once or twice a day as needed fro severe pain only. Please inform. Thank you.   Anitra Kamara MD

## 2022-07-19 ENCOUNTER — OFFICE VISIT (OUTPATIENT)
Dept: FAMILY MEDICINE CLINIC | Age: 37
End: 2022-07-19
Payer: COMMERCIAL

## 2022-07-19 VITALS
HEIGHT: 62 IN | TEMPERATURE: 98.1 F | RESPIRATION RATE: 20 BRPM | HEART RATE: 80 BPM | SYSTOLIC BLOOD PRESSURE: 126 MMHG | WEIGHT: 144 LBS | DIASTOLIC BLOOD PRESSURE: 84 MMHG | BODY MASS INDEX: 26.5 KG/M2

## 2022-07-19 DIAGNOSIS — Z11.1 SCREENING FOR TUBERCULOSIS: Primary | ICD-10-CM

## 2022-07-19 DIAGNOSIS — F98.8 ATTENTION DEFICIT DISORDER (ADD) WITHOUT HYPERACTIVITY: ICD-10-CM

## 2022-07-19 PROCEDURE — 99213 OFFICE O/P EST LOW 20 MIN: CPT | Performed by: NEUROMUSCULOSKELETAL MEDICINE & OMM

## 2022-07-19 PROCEDURE — 86580 TB INTRADERMAL TEST: CPT | Performed by: NEUROMUSCULOSKELETAL MEDICINE & OMM

## 2022-07-19 RX ORDER — ATOMOXETINE 25 MG/1
25 CAPSULE ORAL DAILY
Qty: 30 CAPSULE | Refills: 0 | Status: SHIPPED
Start: 2022-07-19 | End: 2022-09-09 | Stop reason: SDUPTHER

## 2022-07-19 ASSESSMENT — ENCOUNTER SYMPTOMS
COUGH: 0
SHORTNESS OF BREATH: 0
CHOKING: 0
CHEST TIGHTNESS: 0

## 2022-07-19 NOTE — PROGRESS NOTES
complications,  especially if left uncontrolled. Counseled regarding the possible side effects, risks, benefits and alternatives to treatment; patient and/or guardian verbalizes understanding, agrees, feels comfortable with and wishes to proceed withabove treatment plan. Advised patient to call with any new medication issues, and read all Rx infofrom pharmacy to assure aware of all possible risks and side effects of medication before taking. age and gender appropriate health screening exams and vaccinations. Advised patient regarding importance of keeping up with recommended health maintenance and to schedule as soon as possible if overdue, as this isimportant in assessing for undiagnosed pathology, especially cancer, as well as protecting against potentially harmful/life threatening disease. Patient and/or guardian verbalizes understanding andagrees with above counseling, assessment and plan. All questions answered. An electronic signature was used to authenticate this note.     --Maty Sarmiento, DO

## 2022-08-18 PROBLEM — Z11.1 SCREENING FOR TUBERCULOSIS: Status: RESOLVED | Noted: 2022-07-19 | Resolved: 2022-08-18

## 2022-09-08 DIAGNOSIS — E03.9 ACQUIRED HYPOTHYROIDISM: ICD-10-CM

## 2022-09-09 DIAGNOSIS — F98.8 ATTENTION DEFICIT DISORDER (ADD) WITHOUT HYPERACTIVITY: ICD-10-CM

## 2022-09-09 RX ORDER — LEVOTHYROXINE SODIUM 0.1 MG/1
TABLET ORAL
Qty: 30 TABLET | Refills: 5 | Status: SHIPPED | OUTPATIENT
Start: 2022-09-09

## 2022-09-09 RX ORDER — ATOMOXETINE 25 MG/1
25 CAPSULE ORAL DAILY
Qty: 30 CAPSULE | Refills: 0 | Status: SHIPPED | OUTPATIENT
Start: 2022-09-09

## 2022-11-25 ENCOUNTER — NURSE ONLY (OUTPATIENT)
Dept: PRIMARY CARE CLINIC | Age: 37
End: 2022-11-25
Payer: COMMERCIAL

## 2022-11-25 DIAGNOSIS — R05.9 COUGH, UNSPECIFIED TYPE: Primary | ICD-10-CM

## 2022-11-25 DIAGNOSIS — F41.9 ANXIETY: ICD-10-CM

## 2022-11-25 LAB — RSV ANTIGEN: NORMAL

## 2022-11-25 PROCEDURE — 86756 RESPIRATORY VIRUS ANTIBODY: CPT | Performed by: NEUROMUSCULOSKELETAL MEDICINE & OMM

## 2022-11-25 PROCEDURE — 99999 PR OFFICE/OUTPT VISIT,PROCEDURE ONLY: CPT | Performed by: NEUROMUSCULOSKELETAL MEDICINE & OMM

## 2022-11-25 RX ORDER — DULOXETIN HYDROCHLORIDE 60 MG/1
60 CAPSULE, DELAYED RELEASE ORAL DAILY
Qty: 30 CAPSULE | Refills: 2 | Status: SHIPPED | OUTPATIENT
Start: 2022-11-25

## 2023-01-03 ENCOUNTER — PATIENT MESSAGE (OUTPATIENT)
Dept: PAIN MANAGEMENT | Age: 38
End: 2023-01-03

## 2023-01-04 NOTE — TELEPHONE ENCOUNTER
From: Carter Alcazar  To: Dr. Ly Beckham: 1/3/2023 12:08 PM EST  Subject: Lift restrictions    Hello  I am starting a new position at [employer] and I told them about my issues with my back, so now they want a letter with any restrictions or accomodations listed. How do I obtain this? Im supposed to start Jan 9th but am on hold till I can provide this to them.

## 2023-01-05 DIAGNOSIS — M51.36 DDD (DEGENERATIVE DISC DISEASE), LUMBAR: Primary | ICD-10-CM

## 2023-01-05 DIAGNOSIS — E03.9 ACQUIRED HYPOTHYROIDISM: Primary | ICD-10-CM

## 2023-01-05 DIAGNOSIS — M47.817 LUMBOSACRAL SPONDYLOSIS WITHOUT MYELOPATHY: ICD-10-CM

## 2023-05-10 ENCOUNTER — OFFICE VISIT (OUTPATIENT)
Dept: FAMILY MEDICINE CLINIC | Age: 38
End: 2023-05-10
Payer: COMMERCIAL

## 2023-05-10 VITALS
OXYGEN SATURATION: 100 % | HEIGHT: 62 IN | WEIGHT: 137 LBS | SYSTOLIC BLOOD PRESSURE: 110 MMHG | HEART RATE: 72 BPM | BODY MASS INDEX: 25.21 KG/M2 | RESPIRATION RATE: 19 BRPM | TEMPERATURE: 98.3 F | DIASTOLIC BLOOD PRESSURE: 77 MMHG

## 2023-05-10 DIAGNOSIS — F98.8 ATTENTION DEFICIT DISORDER (ADD) WITHOUT HYPERACTIVITY: ICD-10-CM

## 2023-05-10 DIAGNOSIS — E03.9 ACQUIRED HYPOTHYROIDISM: ICD-10-CM

## 2023-05-10 DIAGNOSIS — F41.9 ANXIETY: ICD-10-CM

## 2023-05-10 PROCEDURE — 1036F TOBACCO NON-USER: CPT | Performed by: NEUROMUSCULOSKELETAL MEDICINE & OMM

## 2023-05-10 PROCEDURE — 99213 OFFICE O/P EST LOW 20 MIN: CPT | Performed by: NEUROMUSCULOSKELETAL MEDICINE & OMM

## 2023-05-10 PROCEDURE — G8427 DOCREV CUR MEDS BY ELIG CLIN: HCPCS | Performed by: NEUROMUSCULOSKELETAL MEDICINE & OMM

## 2023-05-10 PROCEDURE — G8419 CALC BMI OUT NRM PARAM NOF/U: HCPCS | Performed by: NEUROMUSCULOSKELETAL MEDICINE & OMM

## 2023-05-10 RX ORDER — LEVOTHYROXINE SODIUM 0.1 MG/1
TABLET ORAL
Qty: 90 TABLET | Refills: 0 | Status: SHIPPED | OUTPATIENT
Start: 2023-05-10

## 2023-05-10 RX ORDER — DULOXETIN HYDROCHLORIDE 60 MG/1
60 CAPSULE, DELAYED RELEASE ORAL DAILY
Qty: 20 CAPSULE | Refills: 0 | Status: SHIPPED | OUTPATIENT
Start: 2023-05-10

## 2023-05-10 RX ORDER — ATOMOXETINE 25 MG/1
25 CAPSULE ORAL DAILY
Qty: 30 CAPSULE | Refills: 0 | Status: CANCELLED | OUTPATIENT
Start: 2023-05-10

## 2023-05-10 RX ORDER — BUPROPION HYDROCHLORIDE 150 MG/1
150 TABLET ORAL EVERY MORNING
Qty: 30 TABLET | Refills: 5 | Status: SHIPPED | OUTPATIENT
Start: 2023-05-10

## 2023-05-10 SDOH — ECONOMIC STABILITY: FOOD INSECURITY: WITHIN THE PAST 12 MONTHS, YOU WORRIED THAT YOUR FOOD WOULD RUN OUT BEFORE YOU GOT MONEY TO BUY MORE.: NEVER TRUE

## 2023-05-10 SDOH — ECONOMIC STABILITY: FOOD INSECURITY: WITHIN THE PAST 12 MONTHS, THE FOOD YOU BOUGHT JUST DIDN'T LAST AND YOU DIDN'T HAVE MONEY TO GET MORE.: NEVER TRUE

## 2023-05-10 SDOH — ECONOMIC STABILITY: INCOME INSECURITY: HOW HARD IS IT FOR YOU TO PAY FOR THE VERY BASICS LIKE FOOD, HOUSING, MEDICAL CARE, AND HEATING?: NOT HARD AT ALL

## 2023-05-10 SDOH — ECONOMIC STABILITY: HOUSING INSECURITY
IN THE LAST 12 MONTHS, WAS THERE A TIME WHEN YOU DID NOT HAVE A STEADY PLACE TO SLEEP OR SLEPT IN A SHELTER (INCLUDING NOW)?: NO

## 2023-05-10 ASSESSMENT — ENCOUNTER SYMPTOMS
COUGH: 0
ABDOMINAL PAIN: 0
SHORTNESS OF BREATH: 0
BLOOD IN STOOL: 0
CHOKING: 0
VOMITING: 0
BACK PAIN: 0
NAUSEA: 0
CONSTIPATION: 0
CHEST TIGHTNESS: 0
ABDOMINAL DISTENTION: 0
DIARRHEA: 0

## 2023-05-10 ASSESSMENT — PATIENT HEALTH QUESTIONNAIRE - PHQ9
7. TROUBLE CONCENTRATING ON THINGS, SUCH AS READING THE NEWSPAPER OR WATCHING TELEVISION: 3
6. FEELING BAD ABOUT YOURSELF - OR THAT YOU ARE A FAILURE OR HAVE LET YOURSELF OR YOUR FAMILY DOWN: 1
4. FEELING TIRED OR HAVING LITTLE ENERGY: 3
SUM OF ALL RESPONSES TO PHQ QUESTIONS 1-9: 10
5. POOR APPETITE OR OVEREATING: 0
1. LITTLE INTEREST OR PLEASURE IN DOING THINGS: 0
3. TROUBLE FALLING OR STAYING ASLEEP: 0
10. IF YOU CHECKED OFF ANY PROBLEMS, HOW DIFFICULT HAVE THESE PROBLEMS MADE IT FOR YOU TO DO YOUR WORK, TAKE CARE OF THINGS AT HOME, OR GET ALONG WITH OTHER PEOPLE: 3
SUM OF ALL RESPONSES TO PHQ QUESTIONS 1-9: 10
9. THOUGHTS THAT YOU WOULD BE BETTER OFF DEAD, OR OF HURTING YOURSELF: 0
SUM OF ALL RESPONSES TO PHQ QUESTIONS 1-9: 10
SUM OF ALL RESPONSES TO PHQ9 QUESTIONS 1 & 2: 2
SUM OF ALL RESPONSES TO PHQ QUESTIONS 1-9: 10
8. MOVING OR SPEAKING SO SLOWLY THAT OTHER PEOPLE COULD HAVE NOTICED. OR THE OPPOSITE, BEING SO FIGETY OR RESTLESS THAT YOU HAVE BEEN MOVING AROUND A LOT MORE THAN USUAL: 1
2. FEELING DOWN, DEPRESSED OR HOPELESS: 2

## 2023-05-10 NOTE — ASSESSMENT & PLAN NOTE
I did refill her Synthroid at current dose which is 100 mcg daily. We will check TSH when appropriate and treat accordingly.

## 2023-05-10 NOTE — PROGRESS NOTES
Myriam Vang (:  1985) is a 44 y.o. female,Established patient, here for evaluation of the following chief complaint(s):  Medication Check (Pt would like to stop taking Cymbalta medication )         ASSESSMENT/PLAN:  1. Acquired hypothyroidism  Comments:  she is compliant with taking the Synthroid. she will get her blood work done today. Assessment & Plan:  I did refill her Synthroid at current dose which is 100 mcg daily. We will check TSH when appropriate and treat accordingly. Orders:  -     levothyroxine (SYNTHROID) 100 MCG tablet; TAKE ONE TABLET BY MOUTH DAILY, Disp-90 tablet, R-0Normal  2. Attention deficit disorder (ADD) without hyperactivity  Comments:  I did not refill the Strattera due to her not taking it on a regular basis. Assessment & Plan:   Patient is now off her Strattera due to nonuse. 3. Anxiety  Comments:  Patient considering stopping Cymbalta due to her being meloncholy all the time. It does help alot with her anxiety/panic. will taper off Cymbalta. Start Wellbut  Assessment & Plan:   Patient's been on a multitude of medications including Celexa, Zoloft, Paxil, and now Cymbalta. We will try Wellbutrin  mg daily for her anxiety and panic disorder. Patient is to follow-up with me in the office in 6 weeks or sooner with worsening symptoms. Orders:  -     DULoxetine (CYMBALTA) 60 MG extended release capsule; Take 1 capsule by mouth daily, Disp-20 capsule, R-0Normal      Return in about 6 weeks (around 2023) for to monitor medical conditions. Subjective   SUBJECTIVE/OBJECTIVE:  HPI 50-year-old female here for follow-up on her antianxiolytic, Cymbalta. Patient wants to be weaned off the Cymbalta due to her being melancholy on the medication. She states that she has no feelings she does not get excited about anything and she suspects this is due to the medication. She does state that the Cymbalta does help a lot with her anxiety and panic attacks.   She is

## 2023-05-10 NOTE — ASSESSMENT & PLAN NOTE
Patient's been on a multitude of medications including Celexa, Zoloft, Paxil, and now Cymbalta. We will try Wellbutrin  mg daily for her anxiety and panic disorder. Patient is to follow-up with me in the office in 6 weeks or sooner with worsening symptoms.

## 2023-08-28 ENCOUNTER — OFFICE VISIT (OUTPATIENT)
Dept: FAMILY MEDICINE CLINIC | Age: 38
End: 2023-08-28
Payer: COMMERCIAL

## 2023-08-28 VITALS
HEIGHT: 62 IN | OXYGEN SATURATION: 100 % | SYSTOLIC BLOOD PRESSURE: 120 MMHG | HEART RATE: 72 BPM | DIASTOLIC BLOOD PRESSURE: 83 MMHG | BODY MASS INDEX: 25.47 KG/M2 | RESPIRATION RATE: 16 BRPM | TEMPERATURE: 98.3 F | WEIGHT: 138.4 LBS

## 2023-08-28 DIAGNOSIS — E03.9 ACQUIRED HYPOTHYROIDISM: ICD-10-CM

## 2023-08-28 DIAGNOSIS — R73.9 HYPERGLYCEMIA: ICD-10-CM

## 2023-08-28 DIAGNOSIS — Z13.6 ENCOUNTER FOR LIPID SCREENING FOR CARDIOVASCULAR DISEASE: ICD-10-CM

## 2023-08-28 DIAGNOSIS — F98.8 ATTENTION DEFICIT DISORDER (ADD) WITHOUT HYPERACTIVITY: ICD-10-CM

## 2023-08-28 DIAGNOSIS — Z13.220 ENCOUNTER FOR LIPID SCREENING FOR CARDIOVASCULAR DISEASE: ICD-10-CM

## 2023-08-28 DIAGNOSIS — Z13.21 ENCOUNTER FOR VITAMIN DEFICIENCY SCREENING: ICD-10-CM

## 2023-08-28 DIAGNOSIS — F41.8 ANXIETY WITH DEPRESSION: Primary | ICD-10-CM

## 2023-08-28 LAB
ABSOLUTE IMMATURE GRANULOCYTE: <0.03 K/UL (ref 0–0.58)
ALBUMIN SERPL-MCNC: 4.7 G/DL (ref 3.5–5.2)
ALP BLD-CCNC: 67 U/L (ref 35–104)
ALT SERPL-CCNC: 15 U/L (ref 0–32)
ANION GAP SERPL CALCULATED.3IONS-SCNC: 17 MMOL/L (ref 7–16)
AST SERPL-CCNC: 16 U/L (ref 0–31)
BASOPHILS ABSOLUTE: 0.01 K/UL (ref 0–0.2)
BASOPHILS RELATIVE PERCENT: 0 % (ref 0–2)
BILIRUB SERPL-MCNC: 0.7 MG/DL (ref 0–1.2)
BUN BLDV-MCNC: 10 MG/DL (ref 6–20)
CALCIUM SERPL-MCNC: 9.1 MG/DL (ref 8.6–10.2)
CHLORIDE BLD-SCNC: 104 MMOL/L (ref 98–107)
CHOLESTEROL: 196 MG/DL
CO2: 19 MMOL/L (ref 22–29)
CREAT SERPL-MCNC: 0.8 MG/DL (ref 0.5–1)
EOSINOPHILS ABSOLUTE: 0.1 K/UL (ref 0.05–0.5)
EOSINOPHILS RELATIVE PERCENT: 1 % (ref 0–6)
GFR SERPL CREATININE-BSD FRML MDRD: >60 ML/MIN/1.73M2
GLUCOSE BLD-MCNC: 89 MG/DL (ref 74–99)
HBA1C MFR BLD: 5 % (ref 4–5.6)
HCT VFR BLD CALC: 37.5 % (ref 34–48)
HDLC SERPL-MCNC: 50 MG/DL
HEMOGLOBIN: 12.3 G/DL (ref 11.5–15.5)
IMMATURE GRANULOCYTES: 0 % (ref 0–5)
LDL CHOLESTEROL: 127 MG/DL
LYMPHOCYTES ABSOLUTE: 1.79 K/UL (ref 1.5–4)
LYMPHOCYTES RELATIVE PERCENT: 25 % (ref 20–42)
MCH RBC QN AUTO: 30.4 PG (ref 26–35)
MCHC RBC AUTO-ENTMCNC: 32.8 G/DL (ref 32–34.5)
MCV RBC AUTO: 92.6 FL (ref 80–99.9)
MONOCYTES ABSOLUTE: 0.63 K/UL (ref 0.1–0.95)
MONOCYTES RELATIVE PERCENT: 9 % (ref 2–12)
NEUTROPHILS ABSOLUTE: 4.66 K/UL (ref 1.8–7.3)
NEUTROPHILS RELATIVE PERCENT: 65 % (ref 43–80)
PDW BLD-RTO: 13.2 % (ref 11.5–15)
PLATELET # BLD: 350 K/UL (ref 130–450)
PMV BLD AUTO: 10.2 FL (ref 7–12)
POTASSIUM SERPL-SCNC: 4.3 MMOL/L (ref 3.5–5)
RBC # BLD: 4.05 M/UL (ref 3.5–5.5)
SODIUM BLD-SCNC: 140 MMOL/L (ref 132–146)
TOTAL PROTEIN: 8.2 G/DL (ref 6.4–8.3)
TRIGL SERPL-MCNC: 95 MG/DL
TSH SERPL DL<=0.05 MIU/L-ACNC: 6.75 UIU/ML (ref 0.27–4.2)
VITAMIN D 25-HYDROXY: 35.4 NG/ML (ref 30–100)
VLDLC SERPL CALC-MCNC: 19 MG/DL
WBC # BLD: 7.2 K/UL (ref 4.5–11.5)

## 2023-08-28 PROCEDURE — 99214 OFFICE O/P EST MOD 30 MIN: CPT | Performed by: NEUROMUSCULOSKELETAL MEDICINE & OMM

## 2023-08-28 PROCEDURE — 1036F TOBACCO NON-USER: CPT | Performed by: NEUROMUSCULOSKELETAL MEDICINE & OMM

## 2023-08-28 PROCEDURE — 36415 COLL VENOUS BLD VENIPUNCTURE: CPT | Performed by: NEUROMUSCULOSKELETAL MEDICINE & OMM

## 2023-08-28 PROCEDURE — G8427 DOCREV CUR MEDS BY ELIG CLIN: HCPCS | Performed by: NEUROMUSCULOSKELETAL MEDICINE & OMM

## 2023-08-28 PROCEDURE — G8419 CALC BMI OUT NRM PARAM NOF/U: HCPCS | Performed by: NEUROMUSCULOSKELETAL MEDICINE & OMM

## 2023-08-28 RX ORDER — DESVENLAFAXINE SUCCINATE 50 MG/1
50 TABLET, EXTENDED RELEASE ORAL DAILY
Qty: 30 TABLET | Refills: 3 | Status: SHIPPED | OUTPATIENT
Start: 2023-08-28

## 2023-08-28 ASSESSMENT — COLUMBIA-SUICIDE SEVERITY RATING SCALE - C-SSRS
5. HAVE YOU STARTED TO WORK OUT OR WORKED OUT THE DETAILS OF HOW TO KILL YOURSELF? DO YOU INTEND TO CARRY OUT THIS PLAN?: NO
2. HAVE YOU ACTUALLY HAD ANY THOUGHTS OF KILLING YOURSELF?: YES
3. HAVE YOU BEEN THINKING ABOUT HOW YOU MIGHT KILL YOURSELF?: NO
4. HAVE YOU HAD THESE THOUGHTS AND HAD SOME INTENTION OF ACTING ON THEM?: NO
1. WITHIN THE PAST MONTH, HAVE YOU WISHED YOU WERE DEAD OR WISHED YOU COULD GO TO SLEEP AND NOT WAKE UP?: NO
6. HAVE YOU EVER DONE ANYTHING, STARTED TO DO ANYTHING, OR PREPARED TO DO ANYTHING TO END YOUR LIFE?: NO

## 2023-08-28 ASSESSMENT — PATIENT HEALTH QUESTIONNAIRE - PHQ9
6. FEELING BAD ABOUT YOURSELF - OR THAT YOU ARE A FAILURE OR HAVE LET YOURSELF OR YOUR FAMILY DOWN: 3
5. POOR APPETITE OR OVEREATING: 3
SUM OF ALL RESPONSES TO PHQ QUESTIONS 1-9: 24
1. LITTLE INTEREST OR PLEASURE IN DOING THINGS: 3
SUM OF ALL RESPONSES TO PHQ QUESTIONS 1-9: 24
8. MOVING OR SPEAKING SO SLOWLY THAT OTHER PEOPLE COULD HAVE NOTICED. OR THE OPPOSITE, BEING SO FIGETY OR RESTLESS THAT YOU HAVE BEEN MOVING AROUND A LOT MORE THAN USUAL: 0
9. THOUGHTS THAT YOU WOULD BE BETTER OFF DEAD, OR OF HURTING YOURSELF: 3
SUM OF ALL RESPONSES TO PHQ QUESTIONS 1-9: 24
2. FEELING DOWN, DEPRESSED OR HOPELESS: 3
3. TROUBLE FALLING OR STAYING ASLEEP: 3
4. FEELING TIRED OR HAVING LITTLE ENERGY: 3
7. TROUBLE CONCENTRATING ON THINGS, SUCH AS READING THE NEWSPAPER OR WATCHING TELEVISION: 3
SUM OF ALL RESPONSES TO PHQ9 QUESTIONS 1 & 2: 6
10. IF YOU CHECKED OFF ANY PROBLEMS, HOW DIFFICULT HAVE THESE PROBLEMS MADE IT FOR YOU TO DO YOUR WORK, TAKE CARE OF THINGS AT HOME, OR GET ALONG WITH OTHER PEOPLE: 3
SUM OF ALL RESPONSES TO PHQ QUESTIONS 1-9: 21

## 2023-08-28 ASSESSMENT — ENCOUNTER SYMPTOMS
ABDOMINAL PAIN: 0
CONSTIPATION: 0
VOMITING: 0
DIARRHEA: 0
NAUSEA: 0
ABDOMINAL DISTENTION: 0
SHORTNESS OF BREATH: 0
COUGH: 0
CHEST TIGHTNESS: 0
BLOOD IN STOOL: 0
CHOKING: 0
WHEEZING: 0

## 2023-08-28 NOTE — PROGRESS NOTES
injection Inject 0.3 mLs into the muscle once for 1 dose Use as directed for allergic reaction 0.3 mL 2    Multiple Vitamins-Minerals (MULTIVITAMIN ADULT) TABS Take by mouth       No current facility-administered medications for this visit. Physical Exam  Vitals and nursing note reviewed. Constitutional:       General: She is not in acute distress. Appearance: Normal appearance. She is not ill-appearing or diaphoretic. HENT:      Head: Normocephalic and atraumatic. Eyes:      General: No scleral icterus. Right eye: No discharge. Left eye: No discharge. Conjunctiva/sclera: Conjunctivae normal.   Cardiovascular:      Rate and Rhythm: Normal rate and regular rhythm. Pulses: Normal pulses. Heart sounds: Normal heart sounds. No murmur heard. No friction rub. No gallop. Pulmonary:      Effort: Pulmonary effort is normal. No respiratory distress. Breath sounds: Normal breath sounds. No stridor. No wheezing, rhonchi or rales. Abdominal:      General: Bowel sounds are normal. There is no distension. Palpations: Abdomen is soft. There is no mass. Musculoskeletal:      Right lower leg: No edema. Left lower leg: No edema. Skin:     Coloration: Skin is not jaundiced or pale. Neurological:      General: No focal deficit present. Mental Status: She is alert and oriented to person, place, and time. Mental status is at baseline. Psychiatric:         Mood and Affect: Mood normal.         Behavior: Behavior normal.       Health Maintenance Due   Topic Date Due    Varicella vaccine (1 of 2 - 2-dose childhood series) Never done    Cervical cancer screen  Never done    COVID-19 Vaccine (4 - Booster for Pfizer series) 05/03/2022    Flu vaccine (1) 08/01/2023     Last colonoscopy not done. Last mammogram not done. Last dexa bone scan not done. Last Cologuard/FIT testing not done.   Last PSA               Past Medical History:   Diagnosis Date    ADHD

## 2023-08-30 RX ORDER — LEVOTHYROXINE SODIUM 125 MCG
125 TABLET ORAL DAILY
Qty: 30 TABLET | Refills: 3 | Status: CANCELLED
Start: 2023-08-30

## 2023-08-30 RX ORDER — LEVOTHYROXINE SODIUM 0.12 MG/1
125 TABLET ORAL DAILY
Qty: 30 TABLET | Refills: 3 | Status: SHIPPED | OUTPATIENT
Start: 2023-08-30

## 2023-10-30 ENCOUNTER — HOSPITAL ENCOUNTER (EMERGENCY)
Age: 38
Discharge: HOME | End: 2023-10-30
Attending: EMERGENCY MEDICINE
Payer: COMMERCIAL

## 2023-10-30 ENCOUNTER — APPOINTMENT (OUTPATIENT)
Dept: CT IMAGING | Age: 38
End: 2023-10-30
Payer: COMMERCIAL

## 2023-10-30 ENCOUNTER — APPOINTMENT (OUTPATIENT)
Dept: GENERAL RADIOLOGY | Age: 38
End: 2023-10-30
Payer: COMMERCIAL

## 2023-10-30 VITALS
WEIGHT: 130 LBS | HEART RATE: 68 BPM | SYSTOLIC BLOOD PRESSURE: 113 MMHG | OXYGEN SATURATION: 100 % | DIASTOLIC BLOOD PRESSURE: 74 MMHG | BODY MASS INDEX: 23.78 KG/M2 | TEMPERATURE: 98.1 F | RESPIRATION RATE: 16 BRPM
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
## 2023-10-30 DIAGNOSIS — S16.1XXA STRAIN OF NECK MUSCLE, INITIAL ENCOUNTER: ICD-10-CM

## 2023-10-30 PROCEDURE — 72125 CT NECK SPINE W/O DYE: CPT

## 2023-10-30 PROCEDURE — 73070 X-RAY EXAM OF ELBOW: CPT

## 2023-10-30 PROCEDURE — 71045 X-RAY EXAM CHEST 1 VIEW: CPT

## 2023-10-30 PROCEDURE — 70450 CT HEAD/BRAIN W/O DYE: CPT

## 2023-10-30 PROCEDURE — 99284 EMERGENCY DEPT VISIT MOD MDM: CPT

## 2023-10-30 RX ORDER — CYCLOBENZAPRINE HCL 10 MG
10 TABLET ORAL NIGHTLY PRN
Qty: 10 TABLET | Refills: 0 | Status: SHIPPED | OUTPATIENT
Start: 2023-10-30 | End: 2023-11-09

## 2024-02-28 ENCOUNTER — OFFICE VISIT (OUTPATIENT)
Dept: FAMILY MEDICINE CLINIC | Age: 39
End: 2024-02-28
Payer: COMMERCIAL

## 2024-02-28 VITALS
TEMPERATURE: 97.5 F | DIASTOLIC BLOOD PRESSURE: 70 MMHG | HEIGHT: 62 IN | BODY MASS INDEX: 24.8 KG/M2 | OXYGEN SATURATION: 100 % | HEART RATE: 62 BPM | RESPIRATION RATE: 18 BRPM | SYSTOLIC BLOOD PRESSURE: 115 MMHG | WEIGHT: 134.8 LBS

## 2024-02-28 DIAGNOSIS — E03.9 ACQUIRED HYPOTHYROIDISM: Primary | ICD-10-CM

## 2024-02-28 DIAGNOSIS — F41.8 ANXIETY WITH DEPRESSION: ICD-10-CM

## 2024-02-28 DIAGNOSIS — R73.9 HYPERGLYCEMIA: ICD-10-CM

## 2024-02-28 DIAGNOSIS — Z13.220 SCREENING FOR HYPERLIPIDEMIA: ICD-10-CM

## 2024-02-28 DIAGNOSIS — E55.9 VITAMIN D DEFICIENCY: ICD-10-CM

## 2024-02-28 PROCEDURE — G8427 DOCREV CUR MEDS BY ELIG CLIN: HCPCS | Performed by: NEUROMUSCULOSKELETAL MEDICINE & OMM

## 2024-02-28 PROCEDURE — 1036F TOBACCO NON-USER: CPT | Performed by: NEUROMUSCULOSKELETAL MEDICINE & OMM

## 2024-02-28 PROCEDURE — G8484 FLU IMMUNIZE NO ADMIN: HCPCS | Performed by: NEUROMUSCULOSKELETAL MEDICINE & OMM

## 2024-02-28 PROCEDURE — G8420 CALC BMI NORM PARAMETERS: HCPCS | Performed by: NEUROMUSCULOSKELETAL MEDICINE & OMM

## 2024-02-28 PROCEDURE — 99214 OFFICE O/P EST MOD 30 MIN: CPT | Performed by: NEUROMUSCULOSKELETAL MEDICINE & OMM

## 2024-02-28 RX ORDER — DESVENLAFAXINE SUCCINATE 50 MG/1
50 TABLET, EXTENDED RELEASE ORAL DAILY
Qty: 30 TABLET | Refills: 3 | Status: SHIPPED | OUTPATIENT
Start: 2024-02-28

## 2024-02-28 RX ORDER — LEVOTHYROXINE SODIUM 0.12 MG/1
125 TABLET ORAL DAILY
Qty: 30 TABLET | Refills: 3 | Status: SHIPPED | OUTPATIENT
Start: 2024-02-28

## 2024-02-28 ASSESSMENT — ENCOUNTER SYMPTOMS
CHOKING: 0
BLOOD IN STOOL: 0
ABDOMINAL DISTENTION: 0
CONSTIPATION: 0
WHEEZING: 0
DIARRHEA: 0
BACK PAIN: 0
SHORTNESS OF BREATH: 0
NAUSEA: 0
VOMITING: 0
CHEST TIGHTNESS: 0
COUGH: 0
ABDOMINAL PAIN: 0

## 2024-02-28 ASSESSMENT — PATIENT HEALTH QUESTIONNAIRE - PHQ9
SUM OF ALL RESPONSES TO PHQ QUESTIONS 1-9: 9
1. LITTLE INTEREST OR PLEASURE IN DOING THINGS: 0
5. POOR APPETITE OR OVEREATING: 0
9. THOUGHTS THAT YOU WOULD BE BETTER OFF DEAD, OR OF HURTING YOURSELF: 0
6. FEELING BAD ABOUT YOURSELF - OR THAT YOU ARE A FAILURE OR HAVE LET YOURSELF OR YOUR FAMILY DOWN: 0
2. FEELING DOWN, DEPRESSED OR HOPELESS: 0
3. TROUBLE FALLING OR STAYING ASLEEP: 3
8. MOVING OR SPEAKING SO SLOWLY THAT OTHER PEOPLE COULD HAVE NOTICED. OR THE OPPOSITE, BEING SO FIGETY OR RESTLESS THAT YOU HAVE BEEN MOVING AROUND A LOT MORE THAN USUAL: 0
SUM OF ALL RESPONSES TO PHQ9 QUESTIONS 1 & 2: 0
SUM OF ALL RESPONSES TO PHQ QUESTIONS 1-9: 9
SUM OF ALL RESPONSES TO PHQ QUESTIONS 1-9: 9
7. TROUBLE CONCENTRATING ON THINGS, SUCH AS READING THE NEWSPAPER OR WATCHING TELEVISION: 3
4. FEELING TIRED OR HAVING LITTLE ENERGY: 3
SUM OF ALL RESPONSES TO PHQ QUESTIONS 1-9: 9
10. IF YOU CHECKED OFF ANY PROBLEMS, HOW DIFFICULT HAVE THESE PROBLEMS MADE IT FOR YOU TO DO YOUR WORK, TAKE CARE OF THINGS AT HOME, OR GET ALONG WITH OTHER PEOPLE: 0

## 2024-02-28 NOTE — PROGRESS NOTES
Adela Rivera (:  1985) is a 38 y.o. female,Established patient, here for evaluation of the following chief complaint(s):  Anxiety (Follow up) and Depression         ASSESSMENT/PLAN:  1. Acquired hypothyroidism  Comments:  Will continue patient on Synthroid 125 mcg daily.  Will check TSH with upcoming blood work and treat accordingly.  Chronic condition that is stable.  Orders:  -     levothyroxine (SYNTHROID) 125 MCG tablet; Take 1 tablet by mouth daily, Disp-30 tablet, R-3Normal  -     CBC with Auto Differential; Future  -     TSH; Future  2. Anxiety with depression  Comments:  Patient is currently on Pristiq 50 mg daily.  Chronic condition that is stable and well-controlled with no side effects of her medication.  Compliance intact  Orders:  -     desvenlafaxine succinate (PRISTIQ) 50 MG TB24 extended release tablet; Take 1 tablet by mouth daily, Disp-30 tablet, R-3Normal  3. Vitamin D deficiency  -     Vitamin D 25 Hydroxy; Future  4. Hyperglycemia  -     Comprehensive Metabolic Panel; Future  5. Screening for hyperlipidemia  -     Lipid Panel; Future      Return in about 6 months (around 2024) for to monitor medical conditions.         Subjective   SUBJECTIVE/OBJECTIVE:  HPI 38-year-old female here today for follow-up on her depression and anxiety.  Patient had stopped her Pristiq for some time but restarted over the last 2 to 3 months with good results.  Her depression and anxiety symptoms are stable much improved since restarting medication.  She also takes Synthroid 125 mcg daily for her hypothyroidism.  She has stopped taking her Strattera for her ADHD symptoms.    Review of Systems   Constitutional:  Negative for activity change, appetite change, chills, diaphoresis, fatigue and fever.   Respiratory:  Negative for cough, choking, chest tightness, shortness of breath and wheezing.    Cardiovascular:  Negative for chest pain, palpitations and leg swelling.   Gastrointestinal:  Negative for

## 2024-03-01 ENCOUNTER — HOSPITAL ENCOUNTER (OUTPATIENT)
Age: 39
Discharge: HOME | End: 2024-03-01
Payer: COMMERCIAL

## 2024-03-01 DIAGNOSIS — R73.9 HYPERGLYCEMIA: ICD-10-CM

## 2024-03-01 DIAGNOSIS — E55.9 VITAMIN D DEFICIENCY: ICD-10-CM

## 2024-03-01 DIAGNOSIS — Z13.220 SCREENING FOR HYPERLIPIDEMIA: ICD-10-CM

## 2024-03-01 DIAGNOSIS — E03.9 ACQUIRED HYPOTHYROIDISM: ICD-10-CM

## 2024-03-01 LAB
25(OH)D3 SERPL-MCNC: 26.1 NG/ML (ref 30–100)
ALBUMIN SERPL-MCNC: 4.4 G/DL (ref 3.5–5.2)
ALP SERPL-CCNC: 54 U/L (ref 35–104)
ALT SERPL-CCNC: 15 U/L (ref 0–32)
ANION GAP SERPL CALCULATED.3IONS-SCNC: 9 MMOL/L (ref 7–16)
AST SERPL-CCNC: 15 U/L (ref 0–31)
BASOPHILS # BLD: 0.02 K/UL (ref 0–0.2)
BASOPHILS NFR BLD: 0 % (ref 0–2)
BILIRUB SERPL-MCNC: 0.4 MG/DL (ref 0–1.2)
BUN SERPL-MCNC: 13 MG/DL (ref 6–20)
CALCIUM SERPL-MCNC: 9.8 MG/DL (ref 8.6–10.2)
CHLORIDE SERPL-SCNC: 102 MMOL/L (ref 98–107)
CHOLEST SERPL-MCNC: 187 MG/DL
CO2 SERPL-SCNC: 27 MMOL/L (ref 22–29)
CREAT SERPL-MCNC: 1 MG/DL (ref 0.5–1)
EOSINOPHIL # BLD: 0.13 K/UL (ref 0.05–0.5)
EOSINOPHILS RELATIVE PERCENT: 2 % (ref 0–6)
ERYTHROCYTE [DISTWIDTH] IN BLOOD BY AUTOMATED COUNT: 13.2 % (ref 11.5–15)
GFR SERPL CREATININE-BSD FRML MDRD: >60 ML/MIN/1.73M2
GLUCOSE SERPL-MCNC: 89 MG/DL (ref 74–99)
HCT VFR BLD AUTO: 37.8 % (ref 34–48)
HDLC SERPL-MCNC: 48 MG/DL
HGB BLD-MCNC: 12.9 G/DL (ref 11.5–15.5)
IMM GRANULOCYTES # BLD AUTO: <0.03 K/UL (ref 0–0.58)
IMM GRANULOCYTES NFR BLD: 0 % (ref 0–5)
LDLC SERPL CALC-MCNC: 125 MG/DL
LYMPHOCYTES NFR BLD: 1.93 K/UL (ref 1.5–4)
LYMPHOCYTES RELATIVE PERCENT: 35 % (ref 20–42)
MCH RBC QN AUTO: 31.7 PG (ref 26–35)
MCHC RBC AUTO-ENTMCNC: 34.1 G/DL (ref 32–34.5)
MCV RBC AUTO: 92.9 FL (ref 80–99.9)
MONOCYTES NFR BLD: 0.54 K/UL (ref 0.1–0.95)
MONOCYTES NFR BLD: 10 % (ref 2–12)
NEUTROPHILS NFR BLD: 53 % (ref 43–80)
NEUTS SEG NFR BLD: 2.96 K/UL (ref 1.8–7.3)
PLATELET # BLD AUTO: 301 K/UL (ref 130–450)
PMV BLD AUTO: 9.5 FL (ref 7–12)
POTASSIUM SERPL-SCNC: 4.3 MMOL/L (ref 3.5–5)
PROT SERPL-MCNC: 7.7 G/DL (ref 6.4–8.3)
RBC # BLD AUTO: 4.07 M/UL (ref 3.5–5.5)
SODIUM SERPL-SCNC: 138 MMOL/L (ref 132–146)
TRIGL SERPL-MCNC: 70 MG/DL
TSH SERPL DL<=0.05 MIU/L-ACNC: 3.9 UIU/ML (ref 0.27–4.2)
VLDLC SERPL CALC-MCNC: 14 MG/DL
WBC OTHER # BLD: 5.6 K/UL (ref 4.5–11.5)

## 2024-03-01 PROCEDURE — 80053 COMPREHEN METABOLIC PANEL: CPT

## 2024-03-01 PROCEDURE — 80061 LIPID PANEL: CPT

## 2024-03-01 PROCEDURE — 82306 VITAMIN D 25 HYDROXY: CPT

## 2024-03-01 PROCEDURE — 84443 ASSAY THYROID STIM HORMONE: CPT

## 2024-03-01 PROCEDURE — 85025 COMPLETE CBC W/AUTO DIFF WBC: CPT

## 2024-03-01 PROCEDURE — 36415 COLL VENOUS BLD VENIPUNCTURE: CPT

## 2024-09-20 ENCOUNTER — OFFICE VISIT (OUTPATIENT)
Dept: FAMILY MEDICINE CLINIC | Age: 39
End: 2024-09-20

## 2024-09-20 VITALS
DIASTOLIC BLOOD PRESSURE: 65 MMHG | SYSTOLIC BLOOD PRESSURE: 102 MMHG | WEIGHT: 139 LBS | HEIGHT: 62 IN | HEART RATE: 67 BPM | TEMPERATURE: 98.4 F | BODY MASS INDEX: 25.58 KG/M2 | OXYGEN SATURATION: 99 %

## 2024-09-20 DIAGNOSIS — E03.9 ACQUIRED HYPOTHYROIDISM: ICD-10-CM

## 2024-09-20 DIAGNOSIS — E78.00 PURE HYPERCHOLESTEROLEMIA: ICD-10-CM

## 2024-09-20 DIAGNOSIS — E55.9 VITAMIN D DEFICIENCY: ICD-10-CM

## 2024-09-20 DIAGNOSIS — R73.9 HYPERGLYCEMIA: ICD-10-CM

## 2024-09-20 DIAGNOSIS — Z91.030 ALLERGY TO HONEY BEE VENOM: Primary | ICD-10-CM

## 2024-09-20 RX ORDER — UBIDECARENONE 75 MG
50 CAPSULE ORAL DAILY
COMMUNITY

## 2024-09-20 RX ORDER — EPINEPHRINE 0.3 MG/.3ML
0.3 INJECTION SUBCUTANEOUS ONCE
Qty: 0.3 ML | Refills: 3 | Status: SHIPPED | OUTPATIENT
Start: 2024-09-20 | End: 2024-09-20

## 2024-09-20 RX ORDER — ACETAMINOPHEN 160 MG
2000 TABLET,DISINTEGRATING ORAL DAILY
COMMUNITY

## 2024-09-20 RX ORDER — LEVOTHYROXINE SODIUM 125 UG/1
125 TABLET ORAL DAILY
Qty: 30 TABLET | Refills: 3 | Status: SHIPPED | OUTPATIENT
Start: 2024-09-20

## 2024-09-20 SDOH — ECONOMIC STABILITY: FOOD INSECURITY: WITHIN THE PAST 12 MONTHS, THE FOOD YOU BOUGHT JUST DIDN'T LAST AND YOU DIDN'T HAVE MONEY TO GET MORE.: NEVER TRUE

## 2024-09-20 SDOH — ECONOMIC STABILITY: FOOD INSECURITY: WITHIN THE PAST 12 MONTHS, YOU WORRIED THAT YOUR FOOD WOULD RUN OUT BEFORE YOU GOT MONEY TO BUY MORE.: NEVER TRUE

## 2024-09-20 SDOH — ECONOMIC STABILITY: INCOME INSECURITY: HOW HARD IS IT FOR YOU TO PAY FOR THE VERY BASICS LIKE FOOD, HOUSING, MEDICAL CARE, AND HEATING?: NOT HARD AT ALL

## 2024-09-20 ASSESSMENT — ENCOUNTER SYMPTOMS
COUGH: 0
BLOOD IN STOOL: 0
SHORTNESS OF BREATH: 0
ABDOMINAL DISTENTION: 0
WHEEZING: 0
CHOKING: 0
BACK PAIN: 0
CONSTIPATION: 0
NAUSEA: 0
VOMITING: 0
CHEST TIGHTNESS: 0
DIARRHEA: 0
ABDOMINAL PAIN: 0

## 2024-11-08 ENCOUNTER — OFFICE VISIT (OUTPATIENT)
Dept: FAMILY MEDICINE CLINIC | Age: 39
End: 2024-11-08

## 2024-11-08 VITALS
DIASTOLIC BLOOD PRESSURE: 65 MMHG | HEART RATE: 54 BPM | SYSTOLIC BLOOD PRESSURE: 107 MMHG | BODY MASS INDEX: 25.91 KG/M2 | RESPIRATION RATE: 18 BRPM | WEIGHT: 140.8 LBS | HEIGHT: 62 IN | TEMPERATURE: 97.6 F | OXYGEN SATURATION: 100 %

## 2024-11-08 DIAGNOSIS — E53.8 VITAMIN B12 DEFICIENCY: ICD-10-CM

## 2024-11-08 DIAGNOSIS — E78.00 PURE HYPERCHOLESTEROLEMIA: ICD-10-CM

## 2024-11-08 DIAGNOSIS — E55.9 VITAMIN D DEFICIENCY: ICD-10-CM

## 2024-11-08 DIAGNOSIS — E03.9 ACQUIRED HYPOTHYROIDISM: ICD-10-CM

## 2024-11-08 DIAGNOSIS — F90.1 ATTENTION DEFICIT HYPERACTIVITY DISORDER (ADHD), PREDOMINANTLY HYPERACTIVE TYPE: Primary | ICD-10-CM

## 2024-11-08 RX ORDER — ATOMOXETINE 25 MG/1
25 CAPSULE ORAL DAILY
Qty: 30 CAPSULE | Refills: 0 | Status: SHIPPED | OUTPATIENT
Start: 2024-11-08 | End: 2024-12-08

## 2024-11-08 ASSESSMENT — ENCOUNTER SYMPTOMS
BACK PAIN: 0
NAUSEA: 0
SHORTNESS OF BREATH: 0
DIARRHEA: 0
VOMITING: 0
WHEEZING: 0
BLOOD IN STOOL: 0
ABDOMINAL PAIN: 0
COUGH: 0
CHEST TIGHTNESS: 0
ANAL BLEEDING: 0
CHOKING: 0

## 2024-11-08 NOTE — PROGRESS NOTES
hallucinations, self-injury, sleep disturbance and suicidal ideas. The patient is not nervous/anxious and is not hyperactive.           Objective   Blood pressure 107/65, pulse 54, temperature 97.6 °F (36.4 °C), temperature source Temporal, resp. rate 18, height 1.575 m (5' 2.01\"), weight 63.9 kg (140 lb 12.8 oz), last menstrual period 11/07/2024, SpO2 100%, not currently breastfeeding.  Current Outpatient Medications   Medication Sig Dispense Refill    atomoxetine (STRATTERA) 25 MG capsule Take 1 capsule by mouth daily 30 capsule 0    Cholecalciferol (VITAMIN D3) 50 MCG (2000 UT) CAPS Take 1 capsule by mouth daily      vitamin B-12 (CYANOCOBALAMIN) 100 MCG tablet Take 0.5 tablets by mouth daily      EPINEPHrine (EPIPEN 2-JOSE) 0.3 MG/0.3ML SOAJ injection Inject 0.3 mLs into the muscle once for 1 dose Use as directed for allergic reaction 0.3 mL 3    levothyroxine (SYNTHROID) 125 MCG tablet Take 1 tablet by mouth daily 30 tablet 3    Multiple Vitamins-Minerals (MULTIVITAMIN ADULT) TABS Take by mouth       No current facility-administered medications for this visit.       Physical Exam  Head is normocephalic and atraumatic.  Lungs are clear to auscultation bilaterally.  Heart has a regular rate and rhythm without murmurs, rubs, or gallops.  No clubbing, cyanosis, or edema noted in the extremities bilaterally.    Vital Signs  Blood pressure is 107/65.       Health Maintenance Due   Topic Date Due    Varicella vaccine (1 of 2 - 13+ 2-dose series) Never done    Hepatitis B vaccine (1 of 3 - 19+ 3-dose series) Never done    Cervical cancer screen  Never done    Flu vaccine (1) 08/01/2024    COVID-19 Vaccine (4 - 2023-24 season) 09/01/2024    Breast cancer screen  Never done     Last colonoscopy No colonoscopy on file   No cologuard on file  No FIT/FOBT on file   No flexible sigmoidoscopy on file   Last mammogram No breast cancer screening on file   Last dexa bone scan none found.  No cervical cancer screening on file

## 2025-03-18 ENCOUNTER — HOSPITAL ENCOUNTER (OUTPATIENT)
Age: 40
Discharge: HOME | End: 2025-03-18
Payer: COMMERCIAL

## 2025-03-18 ENCOUNTER — RESULTS FOLLOW-UP (OUTPATIENT)
Dept: FAMILY MEDICINE CLINIC | Age: 40
End: 2025-03-18

## 2025-03-18 DIAGNOSIS — E03.9 ACQUIRED HYPOTHYROIDISM: ICD-10-CM

## 2025-03-18 DIAGNOSIS — E55.9 VITAMIN D DEFICIENCY: ICD-10-CM

## 2025-03-18 DIAGNOSIS — R73.9 HYPERGLYCEMIA: ICD-10-CM

## 2025-03-18 DIAGNOSIS — E78.00 PURE HYPERCHOLESTEROLEMIA: ICD-10-CM

## 2025-03-18 LAB
25(OH)D3 SERPL-MCNC: 41 NG/ML (ref 30–100)
ALBUMIN SERPL-MCNC: 4.4 G/DL (ref 3.5–5.2)
ALP SERPL-CCNC: 55 U/L (ref 35–104)
ALT SERPL-CCNC: 18 U/L (ref 0–32)
ANION GAP SERPL CALCULATED.3IONS-SCNC: 9 MMOL/L (ref 7–16)
AST SERPL-CCNC: 15 U/L (ref 0–31)
BASOPHILS # BLD: 0.02 K/UL (ref 0–0.2)
BASOPHILS NFR BLD: 0 % (ref 0–2)
BILIRUB SERPL-MCNC: 0.2 MG/DL (ref 0–1.2)
BUN SERPL-MCNC: 8 MG/DL (ref 6–20)
CALCIUM SERPL-MCNC: 9.6 MG/DL (ref 8.6–10.2)
CHLORIDE SERPL-SCNC: 105 MMOL/L (ref 98–107)
CHOLEST SERPL-MCNC: 214 MG/DL
CO2 SERPL-SCNC: 22 MMOL/L (ref 22–29)
CREAT SERPL-MCNC: 0.9 MG/DL (ref 0.5–1)
EOSINOPHIL # BLD: 0.14 K/UL (ref 0.05–0.5)
EOSINOPHILS RELATIVE PERCENT: 2 % (ref 0–6)
ERYTHROCYTE [DISTWIDTH] IN BLOOD BY AUTOMATED COUNT: 12.8 % (ref 11.5–15)
GFR, ESTIMATED: 80 ML/MIN/1.73M2
GLUCOSE SERPL-MCNC: 92 MG/DL (ref 74–99)
HCT VFR BLD AUTO: 39.3 % (ref 34–48)
HDLC SERPL-MCNC: 49 MG/DL
HGB BLD-MCNC: 13.5 G/DL (ref 11.5–15.5)
IMM GRANULOCYTES # BLD AUTO: 0.03 K/UL (ref 0–0.58)
IMM GRANULOCYTES NFR BLD: 0 % (ref 0–5)
LDLC SERPL CALC-MCNC: 137 MG/DL
LYMPHOCYTES NFR BLD: 2.15 K/UL (ref 1.5–4)
LYMPHOCYTES RELATIVE PERCENT: 30 % (ref 20–42)
MCH RBC QN AUTO: 32.3 PG (ref 26–35)
MCHC RBC AUTO-ENTMCNC: 34.4 G/DL (ref 32–34.5)
MCV RBC AUTO: 94 FL (ref 80–99.9)
MONOCYTES NFR BLD: 0.61 K/UL (ref 0.1–0.95)
MONOCYTES NFR BLD: 9 % (ref 2–12)
NEUTROPHILS NFR BLD: 59 % (ref 43–80)
NEUTS SEG NFR BLD: 4.22 K/UL (ref 1.8–7.3)
PLATELET # BLD AUTO: 332 K/UL (ref 130–450)
PMV BLD AUTO: 10.3 FL (ref 7–12)
POTASSIUM SERPL-SCNC: 4.4 MMOL/L (ref 3.5–5)
PROT SERPL-MCNC: 8 G/DL (ref 6.4–8.3)
RBC # BLD AUTO: 4.18 M/UL (ref 3.5–5.5)
SODIUM SERPL-SCNC: 136 MMOL/L (ref 132–146)
TRIGL SERPL-MCNC: 141 MG/DL
TSH SERPL DL<=0.05 MIU/L-ACNC: 42.61 UIU/ML (ref 0.27–4.2)
VLDLC SERPL CALC-MCNC: 28 MG/DL
WBC OTHER # BLD: 7.2 K/UL (ref 4.5–11.5)

## 2025-03-18 PROCEDURE — 36415 COLL VENOUS BLD VENIPUNCTURE: CPT

## 2025-03-18 PROCEDURE — 85025 COMPLETE CBC W/AUTO DIFF WBC: CPT

## 2025-03-18 PROCEDURE — 84443 ASSAY THYROID STIM HORMONE: CPT

## 2025-03-18 PROCEDURE — 80061 LIPID PANEL: CPT

## 2025-03-18 PROCEDURE — 80053 COMPREHEN METABOLIC PANEL: CPT

## 2025-03-18 PROCEDURE — 82306 VITAMIN D 25 HYDROXY: CPT

## 2025-03-18 RX ORDER — LEVOTHYROXINE SODIUM 150 UG/1
150 TABLET ORAL DAILY
Qty: 30 TABLET | Refills: 5 | Status: SHIPPED | OUTPATIENT
Start: 2025-03-18

## 2025-03-19 NOTE — RESULT ENCOUNTER NOTE
Let patient know I reviewed the following blood work:    TSH elevated 42.61, would increase Synthroid 250 mcg daily.  Recheck free T4, and TSH in 6 weeks.    Lipid panel shows , triglycerides 141, and HDL 49.  Would advise low-carb, low-fat, low-cholesterol diet with repeat lipid panel in 9 to 12 months.    Vitamin D level normal at 41.0.    CMP unremarkable.    CBC with differential unremarkable.

## 2025-03-19 NOTE — RESULT ENCOUNTER NOTE
TSH elevated at 42.61, would increase Synthroid to 150 mcg daily.  Recheck free T4 and TSH in 6 weeks.

## 2025-03-26 ENCOUNTER — OFFICE VISIT (OUTPATIENT)
Dept: FAMILY MEDICINE CLINIC | Age: 40
End: 2025-03-26
Payer: COMMERCIAL

## 2025-03-26 VITALS
HEART RATE: 79 BPM | BODY MASS INDEX: 25.03 KG/M2 | HEIGHT: 62 IN | WEIGHT: 136 LBS | OXYGEN SATURATION: 100 % | TEMPERATURE: 97.8 F | DIASTOLIC BLOOD PRESSURE: 73 MMHG | SYSTOLIC BLOOD PRESSURE: 115 MMHG | RESPIRATION RATE: 20 BRPM

## 2025-03-26 DIAGNOSIS — N63.12 MASS OF UPPER INNER QUADRANT OF RIGHT BREAST: Primary | ICD-10-CM

## 2025-03-26 DIAGNOSIS — F41.9 ANXIETY: ICD-10-CM

## 2025-03-26 DIAGNOSIS — E55.9 VITAMIN D DEFICIENCY: ICD-10-CM

## 2025-03-26 DIAGNOSIS — F98.8 ATTENTION DEFICIT DISORDER (ADD) WITHOUT HYPERACTIVITY: ICD-10-CM

## 2025-03-26 DIAGNOSIS — E03.9 ACQUIRED HYPOTHYROIDISM: ICD-10-CM

## 2025-03-26 PROCEDURE — 99214 OFFICE O/P EST MOD 30 MIN: CPT | Performed by: NEUROMUSCULOSKELETAL MEDICINE & OMM

## 2025-03-26 PROCEDURE — G8420 CALC BMI NORM PARAMETERS: HCPCS | Performed by: NEUROMUSCULOSKELETAL MEDICINE & OMM

## 2025-03-26 PROCEDURE — 1036F TOBACCO NON-USER: CPT | Performed by: NEUROMUSCULOSKELETAL MEDICINE & OMM

## 2025-03-26 PROCEDURE — G8427 DOCREV CUR MEDS BY ELIG CLIN: HCPCS | Performed by: NEUROMUSCULOSKELETAL MEDICINE & OMM

## 2025-03-26 SDOH — ECONOMIC STABILITY: FOOD INSECURITY: WITHIN THE PAST 12 MONTHS, YOU WORRIED THAT YOUR FOOD WOULD RUN OUT BEFORE YOU GOT MONEY TO BUY MORE.: NEVER TRUE

## 2025-03-26 SDOH — ECONOMIC STABILITY: INCOME INSECURITY: IN THE LAST 12 MONTHS, WAS THERE A TIME WHEN YOU WERE NOT ABLE TO PAY THE MORTGAGE OR RENT ON TIME?: NO

## 2025-03-26 SDOH — ECONOMIC STABILITY: FOOD INSECURITY: WITHIN THE PAST 12 MONTHS, THE FOOD YOU BOUGHT JUST DIDN'T LAST AND YOU DIDN'T HAVE MONEY TO GET MORE.: NEVER TRUE

## 2025-03-26 ASSESSMENT — ENCOUNTER SYMPTOMS
CHOKING: 0
VOMITING: 0
CHEST TIGHTNESS: 0
SHORTNESS OF BREATH: 0
WHEEZING: 0
NAUSEA: 0
CONSTIPATION: 0
COUGH: 0
DIARRHEA: 0
ABDOMINAL PAIN: 0
STRIDOR: 0

## 2025-03-26 ASSESSMENT — PATIENT HEALTH QUESTIONNAIRE - PHQ9
4. FEELING TIRED OR HAVING LITTLE ENERGY: SEVERAL DAYS
5. POOR APPETITE OR OVEREATING: SEVERAL DAYS
SUM OF ALL RESPONSES TO PHQ QUESTIONS 1-9: 6
10. IF YOU CHECKED OFF ANY PROBLEMS, HOW DIFFICULT HAVE THESE PROBLEMS MADE IT FOR YOU TO DO YOUR WORK, TAKE CARE OF THINGS AT HOME, OR GET ALONG WITH OTHER PEOPLE: SOMEWHAT DIFFICULT
SUM OF ALL RESPONSES TO PHQ QUESTIONS 1-9: 6
3. TROUBLE FALLING OR STAYING ASLEEP: SEVERAL DAYS
7. TROUBLE CONCENTRATING ON THINGS, SUCH AS READING THE NEWSPAPER OR WATCHING TELEVISION: NOT AT ALL
1. LITTLE INTEREST OR PLEASURE IN DOING THINGS: SEVERAL DAYS
10. IF YOU CHECKED OFF ANY PROBLEMS, HOW DIFFICULT HAVE THESE PROBLEMS MADE IT FOR YOU TO DO YOUR WORK, TAKE CARE OF THINGS AT HOME, OR GET ALONG WITH OTHER PEOPLE: SOMEWHAT DIFFICULT
2. FEELING DOWN, DEPRESSED OR HOPELESS: SEVERAL DAYS
2. FEELING DOWN, DEPRESSED OR HOPELESS: SEVERAL DAYS
5. POOR APPETITE OR OVEREATING: SEVERAL DAYS
9. THOUGHTS THAT YOU WOULD BE BETTER OFF DEAD, OR OF HURTING YOURSELF: NOT AT ALL
7. TROUBLE CONCENTRATING ON THINGS, SUCH AS READING THE NEWSPAPER OR WATCHING TELEVISION: NOT AT ALL
SUM OF ALL RESPONSES TO PHQ QUESTIONS 1-9: 6
9. THOUGHTS THAT YOU WOULD BE BETTER OFF DEAD, OR OF HURTING YOURSELF: NOT AT ALL
4. FEELING TIRED OR HAVING LITTLE ENERGY: SEVERAL DAYS
8. MOVING OR SPEAKING SO SLOWLY THAT OTHER PEOPLE COULD HAVE NOTICED. OR THE OPPOSITE - BEING SO FIDGETY OR RESTLESS THAT YOU HAVE BEEN MOVING AROUND A LOT MORE THAN USUAL: NOT AT ALL
SUM OF ALL RESPONSES TO PHQ QUESTIONS 1-9: 6
6. FEELING BAD ABOUT YOURSELF - OR THAT YOU ARE A FAILURE OR HAVE LET YOURSELF OR YOUR FAMILY DOWN: SEVERAL DAYS
SUM OF ALL RESPONSES TO PHQ QUESTIONS 1-9: 6
3. TROUBLE FALLING OR STAYING ASLEEP: SEVERAL DAYS
1. LITTLE INTEREST OR PLEASURE IN DOING THINGS: SEVERAL DAYS
6. FEELING BAD ABOUT YOURSELF - OR THAT YOU ARE A FAILURE OR HAVE LET YOURSELF OR YOUR FAMILY DOWN: SEVERAL DAYS
8. MOVING OR SPEAKING SO SLOWLY THAT OTHER PEOPLE COULD HAVE NOTICED. OR THE OPPOSITE, BEING SO FIGETY OR RESTLESS THAT YOU HAVE BEEN MOVING AROUND A LOT MORE THAN USUAL: NOT AT ALL

## 2025-03-26 NOTE — PROGRESS NOTES
Adela Rivera (:  1985) is a 39 y.o. female, Established patient, here for evaluation of the following chief complaint(s):  Breast Mass (Right breast/)         Assessment & Plan  1. Right breast mass.  - A 1 cm mass was palpated in the right breast at about the 12 o'clock position in the upper quadrant. No other masses were palpated in the right breast.  - The left breast exam was unremarkable with no masses, adenopathy, or swelling in the left axillary area.  - There is no family or personal history of breast cancer.  - A bilateral diagnostic mammogram will be obtained at [facility] per protocol as soon as possible. Depending on the mammogram results, an ultrasound or diagnostic mammogram may be necessary.    2. Hypothyroidism.  - She is currently taking Synthroid 150 mcg.  - Her recent TSH level was 42.6, which is the highest it has been in a long time.  - She reported feeling more energetic and alert after resuming the medication.  - A recheck of TSH and free T4 levels will be done in 6 weeks.    3. Anxiety.  - She is not currently taking any medication for anxiety but reports that it is manageable.  - No specific physical exam findings or test results discussed.  - No additional assessment or counseling provided.  - No medications or therapies prescribed for anxiety.    Follow-up  - The patient will follow up in 6 months or sooner if needed.    Results  Labs   - TSH: 2025, 42.6   - Vitamin D level: 2025, 41.0   - LDL: 2025, 137   - Triglycerides: 2025, 141   - HDL: 2025, 49   - Metabolic panel: 2025, Normal   - Liver function: 2025, Normal   - Kidney function: 2025, Normal   - Blood sugar: 2025, Normal   - Electrolytes: 2025, Normal   - CBC: 2025, Normal  1. Mass of upper inner quadrant of right breast  -     ELVIN DIGITAL DIAGNOSTIC W OR WO CAD BILATERAL; Future  2. Attention deficit disorder (ADD) without

## 2025-04-21 ENCOUNTER — RESULTS FOLLOW-UP (OUTPATIENT)
Dept: FAMILY MEDICINE CLINIC | Age: 40
End: 2025-04-21

## 2025-04-21 ENCOUNTER — HOSPITAL ENCOUNTER (OUTPATIENT)
Dept: GENERAL RADIOLOGY | Age: 40
Discharge: HOME | End: 2025-04-23
Payer: COMMERCIAL

## 2025-04-21 DIAGNOSIS — N63.12 MASS OF UPPER INNER QUADRANT OF RIGHT BREAST: ICD-10-CM

## 2025-04-21 PROCEDURE — 77066 DX MAMMO INCL CAD BI: CPT

## 2025-04-21 PROCEDURE — 76642 ULTRASOUND BREAST LIMITED: CPT

## 2025-04-21 NOTE — RESULT ENCOUNTER NOTE
Let patient know I reviewed the following diagnostic mammogram results:    FINDINGS:  Density: The breasts are heterogeneously dense which may obscure small  masses.  There are no suspicious masses or calcifications in either breast  patient has an area of concern in the upper right breast which is  unremarkable on mammography.    Asymmetry anteriorly in the right medial breast and right lower breast has an  appearance consistent with normal breast tissue on additional spot  compression views.  There is no significant finding bilateral mammogram.    Right breast ultrasound limited: Ultrasound performed targeted to the right  central upper breast 12 o'clock position was normal ultrasound was performed  with scanning from the 11 through the 1 o'clock position.    IMPRESSION:  No findings concerning for malignancy bilateral mammogram.  Unremarkable  ultrasound right upper central breast at patient's area of concern.    Recommendation: Annual mammogram.  Clinical follow-up of patient's area of  concern for lump with negative imaging    BIRADS:  BIRADS - CATEGORY 1    Negative.    OVERALL ASSESSMENT - NEGATIVE    A letter of notification will be sent to the patient regarding the results.

## 2025-04-29 ENCOUNTER — TELEPHONE (OUTPATIENT)
Dept: FAMILY MEDICINE CLINIC | Age: 40
End: 2025-04-29

## 2025-04-30 ENCOUNTER — TELEPHONE (OUTPATIENT)
Dept: FAMILY MEDICINE CLINIC | Age: 40
End: 2025-04-30

## 2025-04-30 ENCOUNTER — TELEPHONE (OUTPATIENT)
Dept: BREAST CENTER | Age: 40
End: 2025-04-30

## 2025-04-30 DIAGNOSIS — Z91.030 ALLERGY TO HONEY BEE VENOM: Primary | ICD-10-CM

## 2025-04-30 DIAGNOSIS — Z91.030 ALLERGY TO HONEY BEE VENOM: ICD-10-CM

## 2025-04-30 RX ORDER — EPINEPHRINE 0.3 MG/.3ML
0.3 INJECTION INTRAMUSCULAR ONCE
Qty: 0.3 ML | Refills: 0 | Status: SHIPPED | OUTPATIENT
Start: 2025-04-30 | End: 2025-04-30

## 2025-04-30 RX ORDER — EPINEPHRINE 0.3 MG/.3ML
0.3 INJECTION SUBCUTANEOUS ONCE
Status: SHIPPED | OUTPATIENT
Start: 2025-04-30

## 2025-04-30 NOTE — TELEPHONE ENCOUNTER
Name of Medication(s) Requested:  Requested Prescriptions     Pending Prescriptions Disp Refills    EPINEPHrine (EPIPEN) 0.3 MG/0.3ML injection 0.3 mg         Medication is on current medication list Yes    Dosage and directions were verified? Yes    Quantity verified: 30 day supply     Pharmacy Verified?  Yes    Last Appointment:  3/26/2025    Future appts:  Future Appointments   Date Time Provider Department Center   9/26/2025  8:00 AM Alejandro Vogel DO [place] [facility]        (If no appt send self scheduling link. .REFILLAPPT)  Scheduling request sent?     [] Yes  [x] No    Does patient need updated?  [] Yes  [x] No

## 2025-04-30 NOTE — TELEPHONE ENCOUNTER
GERHARD Parra made first attempt to schedule patient for consult with [facility] breast clinic. Pt was referred to office for right breast lump by .  RN left voicemail with office contact information for patient to call back and schedule referral appt.      Patient needs scheduled for NEW PATIENT-Right breast lump 12 oclock- B/L diag mammo& Right US 4/21/25 @ [facility]-ref      Electronically signed by Dinah Parra RN on 4/30/25 at 3:37 PM EDT

## 2025-05-12 ENCOUNTER — TELEPHONE (OUTPATIENT)
Dept: BREAST CENTER | Age: 40
End: 2025-05-12

## (undated) DEVICE — 12 ML SYRINGE,LUER-LOCK TIP: Brand: MONOJECT

## (undated) DEVICE — NEEDLE HYPO 18GA L1.5IN PNK POLYPR HUB S STL THN WALL FILL

## (undated) DEVICE — Z DISCONTINUED APPLICATOR SURG PREP 0.35OZ 2% CHG 70% ISO ALC W/ HI LT

## (undated) DEVICE — 3M™ RED DOT™ MONITORING ELECTRODE WITH FOAM TAPE AND STICKY GEL 2560, 50/BAG, 20/CASE, 72/PLT: Brand: RED DOT™

## (undated) DEVICE — 6 ML SYRINGE LUER-LOCK TIP: Brand: MONOJECT

## (undated) DEVICE — Device: Brand: PORTEX

## (undated) DEVICE — GAUZE,SPONGE,4"X4",12PLY,STERILE,LF,2'S: Brand: MEDLINE

## (undated) DEVICE — BANDAGE ADH W1XL3IN NAT FAB WVN FLX DURABLE N ADH PD SEAL

## (undated) DEVICE — GLOVE ORANGE PI 7 1/2   MSG9075

## (undated) DEVICE — SYRINGE, LUER LOCK, 5ML: Brand: MEDLINE

## (undated) DEVICE — NEEDLE HYPO 25GA L1.5IN BLU POLYPR HUB S STL REG BVL STR

## (undated) DEVICE — NON-DEHP CATHETER EXTENSION SET, MALE LUER LOCK ADAPTER